# Patient Record
Sex: FEMALE | Race: WHITE | NOT HISPANIC OR LATINO | Employment: FULL TIME | URBAN - METROPOLITAN AREA
[De-identification: names, ages, dates, MRNs, and addresses within clinical notes are randomized per-mention and may not be internally consistent; named-entity substitution may affect disease eponyms.]

---

## 2023-03-29 LAB — THYROTROPIN (MIU/L) IN SER/PLAS BY DETECTION LIMIT <= 0.05 MIU/L: 2.39 MIU/L (ref 0.44–3.98)

## 2023-04-12 LAB
ANION GAP IN SER/PLAS: 10 MMOL/L (ref 10–20)
CALCIUM (MG/DL) IN SER/PLAS: 8.8 MG/DL (ref 8.6–10.3)
CARBON DIOXIDE, TOTAL (MMOL/L) IN SER/PLAS: 29 MMOL/L (ref 21–32)
CHLORIDE (MMOL/L) IN SER/PLAS: 104 MMOL/L (ref 98–107)
CREATININE (MG/DL) IN SER/PLAS: 0.72 MG/DL (ref 0.5–1.05)
ESTIMATED AVERAGE GLUCOSE FOR HBA1C: 105 MG/DL
GFR FEMALE: >90 ML/MIN/1.73M2
GLUCOSE (MG/DL) IN SER/PLAS: 77 MG/DL (ref 74–99)
HEMOGLOBIN A1C/HEMOGLOBIN TOTAL IN BLOOD: 5.3 %
POTASSIUM (MMOL/L) IN SER/PLAS: 3.8 MMOL/L (ref 3.5–5.3)
SODIUM (MMOL/L) IN SER/PLAS: 139 MMOL/L (ref 136–145)
UREA NITROGEN (MG/DL) IN SER/PLAS: 10 MG/DL (ref 6–23)

## 2023-04-13 LAB
ESTRADIOL (PG/ML) IN SER/PLAS: 52 PG/ML
FOLLITROPIN (IU/L) IN SER/PLAS: 8 IU/L
INSULIN: 13 UIU/ML (ref 3–25)
LUTEINIZING HORMONE (IU/ML) IN SER/PLAS: 10.2 IU/L

## 2023-04-18 LAB
TESTOSTERONE FREE (CHAN): 6.7 PG/ML (ref 0.1–6.4)
TESTOSTERONE,TOTAL,LC-MS/MS: 30 NG/DL (ref 2–45)

## 2023-09-25 LAB — THYROTROPIN (MIU/L) IN SER/PLAS BY DETECTION LIMIT <= 0.05 MIU/L: 2.34 MIU/L (ref 0.44–3.98)

## 2023-10-31 ENCOUNTER — TELEPHONE (OUTPATIENT)
Dept: OBSTETRICS AND GYNECOLOGY | Facility: CLINIC | Age: 23
End: 2023-10-31
Payer: COMMERCIAL

## 2023-10-31 ENCOUNTER — TELEPHONE (OUTPATIENT)
Dept: ENDOCRINOLOGY | Facility: CLINIC | Age: 23
End: 2023-10-31
Payer: COMMERCIAL

## 2023-10-31 DIAGNOSIS — E03.9 HYPOTHYROIDISM IN PREGNANCY, ANTEPARTUM, FIRST TRIMESTER (HHS-HCC): Primary | ICD-10-CM

## 2023-10-31 DIAGNOSIS — O99.281 HYPOTHYROIDISM IN PREGNANCY, ANTEPARTUM, FIRST TRIMESTER (HHS-HCC): Primary | ICD-10-CM

## 2023-10-31 DIAGNOSIS — O36.80X0 PREGNANCY WITH INCONCLUSIVE FETAL VIABILITY (HHS-HCC): ICD-10-CM

## 2023-10-31 DIAGNOSIS — E03.9 HYPOTHYROIDISM, UNSPECIFIED: ICD-10-CM

## 2023-10-31 NOTE — TELEPHONE ENCOUNTER
Ultrasound can be scheduled anytime last week of November or after (including nob appointment if she'd rather)

## 2023-11-01 ENCOUNTER — LAB (OUTPATIENT)
Dept: LAB | Facility: LAB | Age: 23
End: 2023-11-01
Payer: COMMERCIAL

## 2023-11-01 DIAGNOSIS — E03.9 HYPOTHYROIDISM IN PREGNANCY, ANTEPARTUM, FIRST TRIMESTER (HHS-HCC): ICD-10-CM

## 2023-11-01 DIAGNOSIS — O99.281 HYPOTHYROIDISM IN PREGNANCY, ANTEPARTUM, FIRST TRIMESTER (HHS-HCC): ICD-10-CM

## 2023-11-01 LAB — TSH SERPL-ACNC: 2.97 MIU/L (ref 0.44–3.98)

## 2023-11-01 PROCEDURE — 84443 ASSAY THYROID STIM HORMONE: CPT

## 2023-11-01 PROCEDURE — 36415 COLL VENOUS BLD VENIPUNCTURE: CPT

## 2023-11-01 RX ORDER — LEVOTHYROXINE SODIUM 100 UG/1
100 TABLET ORAL
Qty: 90 TABLET | Refills: 1 | Status: SHIPPED | OUTPATIENT
Start: 2023-11-01 | End: 2023-11-30 | Stop reason: DRUGHIGH

## 2023-11-30 ENCOUNTER — ANCILLARY PROCEDURE (OUTPATIENT)
Dept: RADIOLOGY | Facility: CLINIC | Age: 23
End: 2023-11-30
Payer: COMMERCIAL

## 2023-11-30 ENCOUNTER — INITIAL PRENATAL (OUTPATIENT)
Dept: OBSTETRICS AND GYNECOLOGY | Facility: CLINIC | Age: 23
End: 2023-11-30
Payer: COMMERCIAL

## 2023-11-30 ENCOUNTER — LAB (OUTPATIENT)
Dept: LAB | Facility: LAB | Age: 23
End: 2023-11-30
Payer: COMMERCIAL

## 2023-11-30 VITALS — WEIGHT: 195 LBS | DIASTOLIC BLOOD PRESSURE: 82 MMHG | BODY MASS INDEX: 31.98 KG/M2 | SYSTOLIC BLOOD PRESSURE: 120 MMHG

## 2023-11-30 DIAGNOSIS — Z3A.08 8 WEEKS GESTATION OF PREGNANCY (HHS-HCC): ICD-10-CM

## 2023-11-30 DIAGNOSIS — O99.211 OBESITY AFFECTING PREGNANCY IN FIRST TRIMESTER, UNSPECIFIED OBESITY TYPE (HHS-HCC): ICD-10-CM

## 2023-11-30 DIAGNOSIS — O99.281 HYPOTHYROIDISM AFFECTING PREGNANCY IN FIRST TRIMESTER (HHS-HCC): Primary | ICD-10-CM

## 2023-11-30 DIAGNOSIS — E03.9 HYPOTHYROIDISM AFFECTING PREGNANCY IN FIRST TRIMESTER (HHS-HCC): Primary | ICD-10-CM

## 2023-11-30 DIAGNOSIS — O34.219 UTERINE SCAR FROM PREVIOUS CESAREAN DELIVERY AFFECTING PREGNANCY (HHS-HCC): ICD-10-CM

## 2023-11-30 DIAGNOSIS — E03.9 HYPOTHYROIDISM, UNSPECIFIED: ICD-10-CM

## 2023-11-30 DIAGNOSIS — O36.80X0 PREGNANCY WITH INCONCLUSIVE FETAL VIABILITY (HHS-HCC): ICD-10-CM

## 2023-11-30 PROBLEM — Z67.91 RH NEGATIVE STATE IN ANTEPARTUM PERIOD (HHS-HCC): Status: ACTIVE | Noted: 2023-11-30

## 2023-11-30 PROBLEM — O99.280: Status: ACTIVE | Noted: 2023-11-30

## 2023-11-30 PROBLEM — O26.899 RH NEGATIVE STATE IN ANTEPARTUM PERIOD (HHS-HCC): Status: ACTIVE | Noted: 2023-11-30

## 2023-11-30 LAB
ABO GROUP (TYPE) IN BLOOD: NORMAL
ANTIBODY SCREEN: NORMAL
ERYTHROCYTE [DISTWIDTH] IN BLOOD BY AUTOMATED COUNT: 11.7 % (ref 11.5–14.5)
EST. AVERAGE GLUCOSE BLD GHB EST-MCNC: 103 MG/DL
HBA1C MFR BLD: 5.2 %
HBV SURFACE AG SERPL QL IA: NONREACTIVE
HCT VFR BLD AUTO: 38.1 % (ref 36–46)
HGB BLD-MCNC: 12.6 G/DL (ref 12–16)
HIV 1+2 AB+HIV1 P24 AG SERPL QL IA: NONREACTIVE
MCH RBC QN AUTO: 31.3 PG (ref 26–34)
MCHC RBC AUTO-ENTMCNC: 33.1 G/DL (ref 32–36)
MCV RBC AUTO: 95 FL (ref 80–100)
NRBC BLD-RTO: 0 /100 WBCS (ref 0–0)
PLATELET # BLD AUTO: 249 X10*3/UL (ref 150–450)
RBC # BLD AUTO: 4.03 X10*6/UL (ref 4–5.2)
RH FACTOR (ANTIGEN D): NORMAL
WBC # BLD AUTO: 11 X10*3/UL (ref 4.4–11.3)

## 2023-11-30 PROCEDURE — 87800 DETECT AGNT MULT DNA DIREC: CPT

## 2023-11-30 PROCEDURE — 86317 IMMUNOASSAY INFECTIOUS AGENT: CPT

## 2023-11-30 PROCEDURE — 36415 COLL VENOUS BLD VENIPUNCTURE: CPT

## 2023-11-30 PROCEDURE — 86901 BLOOD TYPING SEROLOGIC RH(D): CPT

## 2023-11-30 PROCEDURE — 87389 HIV-1 AG W/HIV-1&-2 AB AG IA: CPT

## 2023-11-30 PROCEDURE — 86780 TREPONEMA PALLIDUM: CPT

## 2023-11-30 PROCEDURE — 86850 RBC ANTIBODY SCREEN: CPT

## 2023-11-30 PROCEDURE — 90686 IIV4 VACC NO PRSV 0.5 ML IM: CPT | Performed by: OBSTETRICS & GYNECOLOGY

## 2023-11-30 PROCEDURE — 87086 URINE CULTURE/COLONY COUNT: CPT

## 2023-11-30 PROCEDURE — 0500F INITIAL PRENATAL CARE VISIT: CPT | Performed by: OBSTETRICS & GYNECOLOGY

## 2023-11-30 PROCEDURE — 90471 IMMUNIZATION ADMIN: CPT | Performed by: OBSTETRICS & GYNECOLOGY

## 2023-11-30 PROCEDURE — 76801 OB US < 14 WKS SINGLE FETUS: CPT

## 2023-11-30 PROCEDURE — 87340 HEPATITIS B SURFACE AG IA: CPT

## 2023-11-30 PROCEDURE — 83036 HEMOGLOBIN GLYCOSYLATED A1C: CPT

## 2023-11-30 PROCEDURE — 86900 BLOOD TYPING SEROLOGIC ABO: CPT

## 2023-11-30 PROCEDURE — 76801 OB US < 14 WKS SINGLE FETUS: CPT | Performed by: STUDENT IN AN ORGANIZED HEALTH CARE EDUCATION/TRAINING PROGRAM

## 2023-11-30 PROCEDURE — 85027 COMPLETE CBC AUTOMATED: CPT

## 2023-11-30 RX ORDER — LEVOTHYROXINE SODIUM 112 UG/1
112 TABLET ORAL
Qty: 30 TABLET | Refills: 11 | Status: SHIPPED | OUTPATIENT
Start: 2023-11-30 | End: 2024-11-29

## 2023-11-30 RX ORDER — METFORMIN HYDROCHLORIDE 500 MG/1
500 TABLET ORAL EVERY 12 HOURS
COMMUNITY

## 2023-11-30 NOTE — PROGRESS NOTES
Subjective   Patient ID [id number]   Fatuma Dang is a 23 y.o.  at 8w2d with a working estimated date of delivery of 2024, by Last Menstrual Period who presents for an initial prenatal visit.     Her pregnancy is complicated by:  Hypothyroidism, obesity with insulin resistance, prior  section.    OB History    Para Term  AB Living   2 1 1     1   SAB IAB Ectopic Multiple Live Births           1      # Outcome Date GA Lbr Harvinder/2nd Weight Sex Delivery Anes PTL Lv   2 Current            1 Term 22 40w0d  3629 g M CS-LTranv EPI N TED          Objective   Physical Exam  Weight: 88.5 kg (195 lb)  Expected Total Weight Gain: 5 kg (11 lb)-9 kg (19 lb)   Pregravid BMI: 30.34  BP: 120/82          Physical Exam  Constitutional:       Appearance: Normal appearance.   Genitourinary:      Bladder, rectum and urethral meatus normal.      Right Labia: No rash or lesions.     Left Labia: No lesions or rash.     No vaginal discharge.      No vaginal prolapse present.     No vaginal atrophy present.       Right Adnexa: not tender and no mass present.     Left Adnexa: not tender and no mass present.     No cervical discharge or lesion.      Uterus is enlarged.      Pelvic exam was performed with patient in the lithotomy position.   Breasts:     Breasts are soft.     Right: Normal.      Left: Normal.   HENT:      Head: Normocephalic and atraumatic.      Nose: Nose normal.   Cardiovascular:      Rate and Rhythm: Normal rate and regular rhythm.      Heart sounds: Normal heart sounds.   Pulmonary:      Effort: Pulmonary effort is normal.      Breath sounds: Normal breath sounds.   Abdominal:      Palpations: Abdomen is soft.   Musculoskeletal:      Cervical back: Neck supple.   Neurological:      General: No focal deficit present.      Mental Status: She is alert and oriented to person, place, and time.   Skin:     General: Skin is warm and dry.      Findings: No rash.   Psychiatric:         Mood and  Affect: Mood normal.       Prenatal Labs  Are ordered    Problem List Items Addressed This Visit       Hypothyroidism complicating pregnancy - Primary    Overview     Plan TSH each trimester with goal of 2.5 or less.         Obesity complicating pregnancy in first trimester    Overview     Starting BMI 31.   Was on metformin at conception. Plan to stop at 12 weeks and proceed with glucola. Hgb A1c is ordered.   Plan EFW at 30 and 36 weeks.          8 weeks gestation of pregnancy    Overview     Desires NIPS.         Uterine scar from previous  delivery affecting pregnancy    Overview     Progressed to 9 cm then arrest of dilation.                Immunizations: flu vaccine today  Prenatal Labs ordered including Hgb A1c.   Daily prenatal vitamins prescribed  First trimester screening and second trimester screening discussed. Patient decided to proceed with NIPS and declines 13 week ultrasound.   Will increase levothyroxine today and recheck TSH at next visit with NIPS.  Follow up in 4-6 weeks for return OB visit.

## 2023-12-01 LAB
C TRACH RRNA SPEC QL NAA+PROBE: NEGATIVE
N GONORRHOEA DNA SPEC QL PROBE+SIG AMP: NEGATIVE
REFLEX ADDED, ANEMIA PANEL: NORMAL
RUBV IGG SERPL IA-ACNC: 0.7 IA
RUBV IGG SERPL QL IA: NEGATIVE
T PALLIDUM AB SER QL: NONREACTIVE

## 2023-12-02 LAB — BACTERIA UR CULT: NORMAL

## 2023-12-07 ENCOUNTER — APPOINTMENT (OUTPATIENT)
Dept: OBSTETRICS AND GYNECOLOGY | Facility: CLINIC | Age: 23
End: 2023-12-07
Payer: COMMERCIAL

## 2023-12-14 ENCOUNTER — APPOINTMENT (OUTPATIENT)
Dept: OBSTETRICS AND GYNECOLOGY | Facility: CLINIC | Age: 23
End: 2023-12-14
Payer: COMMERCIAL

## 2023-12-27 NOTE — RESULT ENCOUNTER NOTE
Labs have been reviewed.  The thyroid level is normal.  Please continue the same dose of Levothyroxine and follow up as scheduled.

## 2024-01-08 PROBLEM — Z3A.14 14 WEEKS GESTATION OF PREGNANCY (HHS-HCC): Status: ACTIVE | Noted: 2023-11-30

## 2024-01-09 ENCOUNTER — ROUTINE PRENATAL (OUTPATIENT)
Dept: OBSTETRICS AND GYNECOLOGY | Facility: CLINIC | Age: 24
End: 2024-01-09
Payer: COMMERCIAL

## 2024-01-09 VITALS — DIASTOLIC BLOOD PRESSURE: 80 MMHG | BODY MASS INDEX: 29.85 KG/M2 | SYSTOLIC BLOOD PRESSURE: 120 MMHG | WEIGHT: 182 LBS

## 2024-01-09 DIAGNOSIS — Z3A.14 14 WEEKS GESTATION OF PREGNANCY (HHS-HCC): ICD-10-CM

## 2024-01-09 DIAGNOSIS — O34.219 UTERINE SCAR FROM PREVIOUS CESAREAN DELIVERY AFFECTING PREGNANCY (HHS-HCC): ICD-10-CM

## 2024-01-09 DIAGNOSIS — O99.211 OBESITY AFFECTING PREGNANCY IN FIRST TRIMESTER, UNSPECIFIED OBESITY TYPE (HHS-HCC): Primary | ICD-10-CM

## 2024-01-09 PROCEDURE — 0501F PRENATAL FLOW SHEET: CPT | Performed by: OBSTETRICS & GYNECOLOGY

## 2024-01-09 NOTE — PROGRESS NOTES
"Subjective   Patient ID [id number]   Fatuma Dang is a 23 y.o. who presents for a routine prenatal visit. She denies vaginal bleeding, leakage of fluid, decreased fetal movements, or contractions.  She has had more mood swings and notes she cries easily. She states she has good support at home and declines counseling or medication.     Objective   Physical Exam  Weight: 82.6 kg (182 lb)  Expected Total Weight Gain: 5 kg (11 lb)-9 kg (19 lb)   Pregravid BMI: 30.34  BP: 120/80  Fetal Heart Rate: 150      Prenatal Labs  Urine dip:  No results found for: \"KETONESU\", \"GLUCOSEUR\", \"LEUKOCYTESUR\"    Lab Results   Component Value Date    HGB 12.6 2023    HCT 38.1 2023    ABO B 2023    HEPBSAG Nonreactive 2023         Problem List Items Addressed This Visit       Obesity complicating pregnancy in first trimester - Primary    Overview     Starting BMI 31.   Was on metformin at conception. Plan to stop at 12 weeks and proceed with glucola. Hgb A1c 5.2%.  Plan EFW at 30 and 36 weeks.          14 weeks gestation of pregnancy    Overview     Desires NIPS.         Uterine scar from previous  delivery affecting pregnancy    Overview     Progressed to 9 cm then arrest of dilation. She desires 39 week repeat CS.              Continue prenatal vitamin.  Labs reviewed.  Follow up for usn and routine care.  Follow up as scheduled for a routine prenatal visit.  "

## 2024-01-10 ENCOUNTER — TELEPHONE (OUTPATIENT)
Dept: OBSTETRICS AND GYNECOLOGY | Facility: CLINIC | Age: 24
End: 2024-01-10
Payer: COMMERCIAL

## 2024-01-10 DIAGNOSIS — E03.9 HYPOTHYROIDISM AFFECTING PREGNANCY IN SECOND TRIMESTER (HHS-HCC): ICD-10-CM

## 2024-01-10 DIAGNOSIS — O99.282 HYPOTHYROIDISM AFFECTING PREGNANCY IN SECOND TRIMESTER (HHS-HCC): ICD-10-CM

## 2024-01-10 DIAGNOSIS — Z3A.14 14 WEEKS GESTATION OF PREGNANCY (HHS-HCC): ICD-10-CM

## 2024-01-10 NOTE — TELEPHONE ENCOUNTER
----- Message from Fatuma Dang sent at 1/10/2024  1:23 PM EST -----  Regarding: NIPT test and Thyroid test  Contact: [phone]  Hi!    I just had my 14 week OB appointment yesterday, and I realized I forgot to ask about if my bloodwork requests were in the system.    The nurse asked about wanting to do NIPT testing. I said I want to do it (+ the gender addition), but then I forgot to ask about whether or not I needed any paperwork to get that bloodwork done.    Additionally, at 8 weeks, we upped my synthroid medication from 100 to 112, and I was supposed to remind Dr. Perez at yesterdays appointment to recheck my thyroid. Which I also forgot to do (blaming pregnancy brain :) ).    Could you let me know if/when these labs are in the system and I'll go get my bloodwork drawn? I typically use the  lab in [place] unless I'm already at [place] for an appointment. Thanks!!

## 2024-01-10 NOTE — TELEPHONE ENCOUNTER
----- Message from Fatuma Dang sent at 1/10/2024  1:23 PM EST -----  Regarding: NIPT test and Thyroid test  Contact: [phone]  Hi!    I just had my 14 week OB appointment yesterday, and I realized I forgot to ask about if my bloodwork requests were in the system.    The nurse asked about wanting to do NIPT testing. I said I want to do it (+ the gender addition), but then I forgot to ask about whether or not I needed any paperwork to get that bloodwork done.    Additionally, at 8 weeks, we upped my synthroid medication from 100 to 112, and I was supposed to remind Dr. Perez at yesterdays appointment to recheck my thyroid. Which I also forgot to do (blaming pregnancy brain :) ).    Could you let me know if/when these labs are in the system and I'll go get my bloodwork drawn? I typically use the  lab in [place] unless I'm already at [place] for an appointment. Thanks!!

## 2024-01-17 ENCOUNTER — LAB (OUTPATIENT)
Dept: LAB | Facility: LAB | Age: 24
End: 2024-01-17
Payer: COMMERCIAL

## 2024-01-17 DIAGNOSIS — O99.282 HYPOTHYROIDISM AFFECTING PREGNANCY IN SECOND TRIMESTER (HHS-HCC): ICD-10-CM

## 2024-01-17 DIAGNOSIS — Z3A.14 14 WEEKS GESTATION OF PREGNANCY (HHS-HCC): ICD-10-CM

## 2024-01-17 DIAGNOSIS — E03.9 HYPOTHYROIDISM AFFECTING PREGNANCY IN SECOND TRIMESTER (HHS-HCC): ICD-10-CM

## 2024-01-17 LAB — TSH SERPL-ACNC: 0.91 MIU/L (ref 0.44–3.98)

## 2024-01-17 PROCEDURE — 84443 ASSAY THYROID STIM HORMONE: CPT

## 2024-01-17 PROCEDURE — 36415 COLL VENOUS BLD VENIPUNCTURE: CPT

## 2024-02-19 PROBLEM — Z3A.20 20 WEEKS GESTATION OF PREGNANCY (HHS-HCC): Status: ACTIVE | Noted: 2023-11-30

## 2024-02-22 ENCOUNTER — ROUTINE PRENATAL (OUTPATIENT)
Dept: OBSTETRICS AND GYNECOLOGY | Facility: CLINIC | Age: 24
End: 2024-02-22
Payer: COMMERCIAL

## 2024-02-22 ENCOUNTER — HOSPITAL ENCOUNTER (OUTPATIENT)
Dept: RADIOLOGY | Facility: CLINIC | Age: 24
Discharge: HOME | End: 2024-02-22
Payer: COMMERCIAL

## 2024-02-22 VITALS — WEIGHT: 180 LBS | DIASTOLIC BLOOD PRESSURE: 72 MMHG | SYSTOLIC BLOOD PRESSURE: 102 MMHG | BODY MASS INDEX: 29.52 KG/M2

## 2024-02-22 DIAGNOSIS — Z36.89 SCREENING, ANTENATAL, FOR FETAL ANATOMIC SURVEY (HHS-HCC): ICD-10-CM

## 2024-02-22 DIAGNOSIS — O99.280 HYPOTHYROIDISM AFFECTING PREGNANCY, ANTEPARTUM (HHS-HCC): ICD-10-CM

## 2024-02-22 DIAGNOSIS — O34.219 UTERINE SCAR FROM PREVIOUS CESAREAN DELIVERY AFFECTING PREGNANCY (HHS-HCC): ICD-10-CM

## 2024-02-22 DIAGNOSIS — Z67.91 RH NEGATIVE STATE IN ANTEPARTUM PERIOD (HHS-HCC): ICD-10-CM

## 2024-02-22 DIAGNOSIS — E03.9 HYPOTHYROIDISM AFFECTING PREGNANCY, ANTEPARTUM (HHS-HCC): ICD-10-CM

## 2024-02-22 DIAGNOSIS — O26.899 RH NEGATIVE STATE IN ANTEPARTUM PERIOD (HHS-HCC): ICD-10-CM

## 2024-02-22 DIAGNOSIS — Z3A.20 20 WEEKS GESTATION OF PREGNANCY (HHS-HCC): ICD-10-CM

## 2024-02-22 DIAGNOSIS — O99.212 OBESITY AFFECTING PREGNANCY IN SECOND TRIMESTER, UNSPECIFIED OBESITY TYPE (HHS-HCC): Primary | ICD-10-CM

## 2024-02-22 PROCEDURE — 0501F PRENATAL FLOW SHEET: CPT | Performed by: OBSTETRICS & GYNECOLOGY

## 2024-02-22 PROCEDURE — 76811 OB US DETAILED SNGL FETUS: CPT

## 2024-02-22 PROCEDURE — 76811 OB US DETAILED SNGL FETUS: CPT | Performed by: OBSTETRICS & GYNECOLOGY

## 2024-02-22 NOTE — PROGRESS NOTES
"Subjective   Patient ID [id number]   Fatuma Dang is a 23 y.o. who presents for a routine prenatal visit. She denies vaginal bleeding, leakage of fluid, decreased fetal movements, or contractions.  Anatomy usn was completed prior to visit with report pending.   Nausea is improved and she is eating well now.     Objective   Physical Exam  Weight: 81.6 kg (180 lb)  Expected Total Weight Gain: 5 kg (11 lb)-9 kg (19 lb)   Pregravid BMI: 30.34  BP: 102/72  Fetal Heart Rate: 140 Fundal Height (cm): 20 cm    Prenatal Labs  Urine dip:  No results found for: \"KETONESU\", \"GLUCOSEUR\", \"LEUKOCYTESUR\"    Lab Results   Component Value Date    HGB 12.6 2023    HCT 38.1 2023    ABO B 2023    HEPBSAG Nonreactive 2023         Problem List Items Addressed This Visit       Hypothyroidism complicating pregnancy    Overview     Plan TSH each trimester with goal of 2.5 or less.  TSH 2.97 on 2023.  TSH 0.91 on 2024 at 15 weeks.         Obesity affecting pregnancy in second trimester - Primary    Overview     Starting BMI 31.   Was on metformin at conception. Plan to stop at 12 weeks and proceed with glucola. Hgb A1c 5.2%.  Plan EFW at 30 and 36 weeks.          20 weeks gestation of pregnancy    Overview     Desires NIPS.         Uterine scar from previous  delivery affecting pregnancy    Overview     Progressed to 9 cm then arrest of dilation. She desires 39 week repeat CS.          Rh negative state in antepartum period        Continue prenatal vitamin.  Labs reviewed.    Follow up as scheduled for a routine prenatal visit.  "

## 2024-03-12 NOTE — PATIENT INSTRUCTIONS
Thank you for choosing [facility]  for your health care needs.  If you have any questions, concerns or medical needs, please feel free to contact our office at [phone].    Please ensure you complete your blood work one week before the next scheduled appointment.    To obtain blood test  To continue Levothyroxine 112mcg daily   Take levothyroxine on an empty stomach with water alone, 30-60 before eating or taking other medications, 4 hours before any calcium or iron supplement.  For follow up at 34 weeks

## 2024-03-12 NOTE — PROGRESS NOTES
"Subjective   Fatuma Dang is a 24 y.o. female who presents for follow up for Hashimoto's thyroiditis.      She is currently 23 weeks pregnant.  She lost 20 pounds in the first trimester.  She is now back to her prepregnancy weight.  Metformin was stopped at 12 weeks.  OG 7/9/24.      Currently Regimen  Levothyroxine 112mcg po daily      The patient had a thyroid ultrasound in November 2019 2019 which revealed changes consistent with Hashimoto's thyroiditis     Symptoms are as listed below:  Energy levels - good  Sleep - noctuira x 1 for the last 6-8 weeks   Temperature intolerances -denies   Bowel movements -once daily; regular  Hair changes -denies  Skin - dry to weather  Tremors - denies   Palpitations - denies   Dysphagia - denies   Dyspnea upon lying supine -denies   Dysphonia -denies   Weight changes -gained      Review of Systems   All other systems reviewed and are negative.      Objective   LMP 10/03/2023    Visit Vitals  /60 (BP Location: Left arm, Patient Position: Sitting, BP Cuff Size: Adult)   Pulse 83   Ht 1.676 m (5' 6\")   Wt 84.4 kg (186 lb)   LMP 10/03/2023   BMI 30.02 kg/m²   OB Status Pregnant   Smoking Status Never   BSA 1.98 m²      Physical Exam  Vitals and nursing note reviewed.   Constitutional:       General: She is not in acute distress.     Appearance: Normal appearance. She is normal weight.   HENT:      Head: Normocephalic and atraumatic.      Nose: Nose normal.      Mouth/Throat:      Mouth: Mucous membranes are moist.   Eyes:      Extraocular Movements: Extraocular movements intact.   Cardiovascular:      Rate and Rhythm: Normal rate and regular rhythm.   Pulmonary:      Effort: Pulmonary effort is normal.      Breath sounds: Normal breath sounds.   Abdominal:      Comments: Gravid    Musculoskeletal:         General: Normal range of motion.   Skin:     General: Skin is warm.   Neurological:      Mental Status: She is alert and oriented to person, place, and time. "   Psychiatric:         Mood and Affect: Mood normal.         Lab Results   Component Value Date    TSH 0.91 01/17/2024    FREET4 0.66 08/16/2022       Assessment/Plan   24 year old female presents for follow up for Hashimoto's thyroiditis.  She is currently 23 weeks pregnant.    Hashimoto's thyroiditis  To obtain blood test  To continue Levothyroxine 112mcg daily   Take levothyroxine on an empty stomach with water alone, 30-60 before eating or taking other medications, 4 hours before any calcium or iron supplement  Counseled that the goal TSH for the second trimester should be between  0.2-3.0 mIU/L  For follow up at 34 weeks

## 2024-03-13 ENCOUNTER — OFFICE VISIT (OUTPATIENT)
Dept: ENDOCRINOLOGY | Facility: CLINIC | Age: 24
End: 2024-03-13
Payer: COMMERCIAL

## 2024-03-13 VITALS
BODY MASS INDEX: 29.89 KG/M2 | DIASTOLIC BLOOD PRESSURE: 60 MMHG | HEIGHT: 66 IN | HEART RATE: 83 BPM | SYSTOLIC BLOOD PRESSURE: 120 MMHG | WEIGHT: 186 LBS

## 2024-03-13 DIAGNOSIS — E03.9 HYPOTHYROIDISM AFFECTING PREGNANCY IN SECOND TRIMESTER (HHS-HCC): Primary | ICD-10-CM

## 2024-03-13 DIAGNOSIS — O99.282 HYPOTHYROIDISM AFFECTING PREGNANCY IN SECOND TRIMESTER (HHS-HCC): Primary | ICD-10-CM

## 2024-03-13 PROCEDURE — 99213 OFFICE O/P EST LOW 20 MIN: CPT | Performed by: INTERNAL MEDICINE

## 2024-03-13 PROCEDURE — 1036F TOBACCO NON-USER: CPT | Performed by: INTERNAL MEDICINE

## 2024-03-14 PROBLEM — E06.3 HASHIMOTO'S THYROIDITIS: Status: ACTIVE | Noted: 2024-03-14

## 2024-03-14 NOTE — ASSESSMENT & PLAN NOTE
To obtain blood test  To continue Levothyroxine 112mcg daily   Take levothyroxine on an empty stomach with water alone, 30-60 before eating or taking other medications, 4 hours before any calcium or iron supplement  Counseled that the goal TSH for the second trimester should be between  0.2-3.0 mIU/L  For follow up at 34 weeks

## 2024-03-19 PROBLEM — Z3A.24 24 WEEKS GESTATION OF PREGNANCY (HHS-HCC): Status: ACTIVE | Noted: 2023-11-30

## 2024-03-21 ENCOUNTER — ROUTINE PRENATAL (OUTPATIENT)
Dept: OBSTETRICS AND GYNECOLOGY | Facility: CLINIC | Age: 24
End: 2024-03-21
Payer: COMMERCIAL

## 2024-03-21 ENCOUNTER — LAB (OUTPATIENT)
Dept: LAB | Facility: LAB | Age: 24
End: 2024-03-21
Payer: COMMERCIAL

## 2024-03-21 VITALS — DIASTOLIC BLOOD PRESSURE: 76 MMHG | WEIGHT: 185 LBS | BODY MASS INDEX: 29.86 KG/M2 | SYSTOLIC BLOOD PRESSURE: 124 MMHG

## 2024-03-21 DIAGNOSIS — E03.9 HYPOTHYROIDISM AFFECTING PREGNANCY IN SECOND TRIMESTER (HHS-HCC): ICD-10-CM

## 2024-03-21 DIAGNOSIS — O34.219 UTERINE SCAR FROM PREVIOUS CESAREAN DELIVERY AFFECTING PREGNANCY (HHS-HCC): Primary | ICD-10-CM

## 2024-03-21 DIAGNOSIS — O99.212 OBESITY AFFECTING PREGNANCY IN SECOND TRIMESTER, UNSPECIFIED OBESITY TYPE (HHS-HCC): ICD-10-CM

## 2024-03-21 DIAGNOSIS — O99.282 HYPOTHYROIDISM AFFECTING PREGNANCY IN SECOND TRIMESTER (HHS-HCC): ICD-10-CM

## 2024-03-21 DIAGNOSIS — Z3A.24 24 WEEKS GESTATION OF PREGNANCY (HHS-HCC): ICD-10-CM

## 2024-03-21 DIAGNOSIS — O26.899 RH NEGATIVE STATE IN ANTEPARTUM PERIOD (HHS-HCC): ICD-10-CM

## 2024-03-21 DIAGNOSIS — Z67.91 RH NEGATIVE STATE IN ANTEPARTUM PERIOD (HHS-HCC): ICD-10-CM

## 2024-03-21 LAB
ABO GROUP (TYPE) IN BLOOD: NORMAL
ANTIBODY SCREEN: NORMAL
ERYTHROCYTE [DISTWIDTH] IN BLOOD BY AUTOMATED COUNT: 12.8 % (ref 11.5–14.5)
GLUCOSE 1H P 50 G GLC PO SERPL-MCNC: 98 MG/DL
HCT VFR BLD AUTO: 37.7 % (ref 36–46)
HGB BLD-MCNC: 12 G/DL (ref 12–16)
MCH RBC QN AUTO: 31.1 PG (ref 26–34)
MCHC RBC AUTO-ENTMCNC: 31.8 G/DL (ref 32–36)
MCV RBC AUTO: 98 FL (ref 80–100)
NRBC BLD-RTO: 0 /100 WBCS (ref 0–0)
PLATELET # BLD AUTO: 175 X10*3/UL (ref 150–450)
RBC # BLD AUTO: 3.86 X10*6/UL (ref 4–5.2)
REFLEX ADDED, ANEMIA PANEL: NORMAL
RH FACTOR (ANTIGEN D): NORMAL
TREPONEMA PALLIDUM IGG+IGM AB [PRESENCE] IN SERUM OR PLASMA BY IMMUNOASSAY: NONREACTIVE
TSH SERPL-ACNC: 1.83 MIU/L (ref 0.44–3.98)
WBC # BLD AUTO: 12.8 X10*3/UL (ref 4.4–11.3)

## 2024-03-21 PROCEDURE — 86780 TREPONEMA PALLIDUM: CPT

## 2024-03-21 PROCEDURE — 0501F PRENATAL FLOW SHEET: CPT | Performed by: OBSTETRICS & GYNECOLOGY

## 2024-03-21 PROCEDURE — 84443 ASSAY THYROID STIM HORMONE: CPT

## 2024-03-21 PROCEDURE — 36415 COLL VENOUS BLD VENIPUNCTURE: CPT

## 2024-03-21 PROCEDURE — 82947 ASSAY GLUCOSE BLOOD QUANT: CPT

## 2024-03-21 PROCEDURE — 86850 RBC ANTIBODY SCREEN: CPT

## 2024-03-21 PROCEDURE — 86900 BLOOD TYPING SEROLOGIC ABO: CPT

## 2024-03-21 PROCEDURE — 85027 COMPLETE CBC AUTOMATED: CPT

## 2024-03-21 PROCEDURE — 86901 BLOOD TYPING SEROLOGIC RH(D): CPT

## 2024-03-21 NOTE — PROGRESS NOTES
"Subjective   Patient ID [id number]   Fatuma Dang is a 24 y.o. who presents for a routine prenatal visit. She denies vaginal bleeding, leakage of fluid, decreased fetal movements, or contractions.      Objective   Physical Exam  Weight: 83.9 kg (185 lb)  Expected Total Weight Gain: 7 kg (15 lb)-11.5 kg (25 lb)   Pregravid BMI: 29.87  BP: 124/76  Fetal Heart Rate: 135 Fundal Height (cm): 24 cm    Prenatal Labs  Urine dip:  No results found for: \"KETONESU\", \"GLUCOSEUR\", \"LEUKOCYTESUR\"    Lab Results   Component Value Date    HGB 12.6 2023    HCT 38.1 2023    ABO B 2023    HEPBSAG Nonreactive 2023         Problem List Items Addressed This Visit       Hypothyroidism complicating pregnancy    Overview     Plan TSH each trimester with goal of 2.5 or less.  TSH 2.97 on 2023.  TSH 0.91 on 2024 at 15 weeks.         Obesity affecting pregnancy in second trimester    Overview     Starting BMI 31.   Was on metformin at conception. Plan to stop at 12 weeks and proceed with glucola. Hgb A1c 5.2%.  Plan EFW at 30 and 36 weeks.          Relevant Orders    Glucose, 1 Hour Screen, Pregnancy    CBC Anemia Panel With Reflex,Pregnancy    Syphilis Screen with Reflex    24 weeks gestation of pregnancy    Overview     Desires NIPS.         Uterine scar from previous  delivery affecting pregnancy - Primary    Overview     Progressed to 9 cm then arrest of dilation. She desires 39 week repeat CS.          Rh negative state in antepartum period    Relevant Orders    Type And Screen        Continue prenatal vitamin.  Labs reviewed.  Glucola and TSH are to be done today.   Follow up as scheduled for a routine prenatal visit.  "

## 2024-03-31 NOTE — RESULT ENCOUNTER NOTE
Labs have been reviewed. The thyroid level looks good.  Please continue the same dose of Levothyroxine and follow up as scheduled.

## 2024-04-12 PROBLEM — Z3A.28 28 WEEKS GESTATION OF PREGNANCY (HHS-HCC): Status: ACTIVE | Noted: 2023-11-30

## 2024-04-18 ENCOUNTER — ROUTINE PRENATAL (OUTPATIENT)
Dept: OBSTETRICS AND GYNECOLOGY | Facility: CLINIC | Age: 24
End: 2024-04-18
Payer: COMMERCIAL

## 2024-04-18 VITALS — BODY MASS INDEX: 30.02 KG/M2 | DIASTOLIC BLOOD PRESSURE: 74 MMHG | SYSTOLIC BLOOD PRESSURE: 106 MMHG | WEIGHT: 186 LBS

## 2024-04-18 DIAGNOSIS — O99.280 HYPOTHYROIDISM AFFECTING PREGNANCY, ANTEPARTUM (HHS-HCC): ICD-10-CM

## 2024-04-18 DIAGNOSIS — O99.213 OBESITY AFFECTING PREGNANCY IN THIRD TRIMESTER, UNSPECIFIED OBESITY TYPE (HHS-HCC): Primary | ICD-10-CM

## 2024-04-18 DIAGNOSIS — E03.9 HYPOTHYROIDISM AFFECTING PREGNANCY, ANTEPARTUM (HHS-HCC): ICD-10-CM

## 2024-04-18 DIAGNOSIS — Z3A.28 28 WEEKS GESTATION OF PREGNANCY (HHS-HCC): ICD-10-CM

## 2024-04-18 DIAGNOSIS — Z23 NEED FOR DIPHTHERIA-TETANUS-PERTUSSIS (TDAP) VACCINE: ICD-10-CM

## 2024-04-18 DIAGNOSIS — Z67.91 RH NEGATIVE STATE IN ANTEPARTUM PERIOD (HHS-HCC): ICD-10-CM

## 2024-04-18 DIAGNOSIS — O34.219 UTERINE SCAR FROM PREVIOUS CESAREAN DELIVERY AFFECTING PREGNANCY (HHS-HCC): ICD-10-CM

## 2024-04-18 DIAGNOSIS — O26.899 RH NEGATIVE STATE IN ANTEPARTUM PERIOD (HHS-HCC): ICD-10-CM

## 2024-04-18 PROCEDURE — 96372 THER/PROPH/DIAG INJ SC/IM: CPT | Performed by: OBSTETRICS & GYNECOLOGY

## 2024-04-18 PROCEDURE — 90715 TDAP VACCINE 7 YRS/> IM: CPT | Performed by: OBSTETRICS & GYNECOLOGY

## 2024-04-18 PROCEDURE — 0501F PRENATAL FLOW SHEET: CPT | Performed by: OBSTETRICS & GYNECOLOGY

## 2024-04-18 PROCEDURE — 90471 IMMUNIZATION ADMIN: CPT | Performed by: OBSTETRICS & GYNECOLOGY

## 2024-04-18 NOTE — PROGRESS NOTES
"Subjective   Patient ID [id number]   Fatuma Dang is a 24 y.o.   at 28w2d with a working estimated date of delivery of 2024, by Last Menstrual Period who presents for a routine prenatal visit. She denies vaginal bleeding, leakage of fluid, decreased fetal movements, or contractions.  She has learned her job is ending this  and she will also loose her paid maternity leave.     Objective   Physical Exam  Weight: 84.4 kg (186 lb)  Expected Total Weight Gain: 7 kg (15 lb)-11.5 kg (25 lb)   Pregravid BMI: 29.87  BP: 106/74  Fetal Heart Rate: 140 Fundal Height (cm): 28 cm    Prenatal Labs  Urine dip:  No results found for: \"KETONESU\", \"GLUCOSEUR\", \"LEUKOCYTESUR\"    Lab Results   Component Value Date    HGB 12.0 2024    HCT 37.7 2024    ABO B 2024    HEPBSAG Nonreactive 2023         Problem List Items Addressed This Visit       28 weeks gestation of pregnancy ([facility])    Overview     Glucola 98.         Hypothyroidism complicating pregnancy ([facility])    Overview     Plan TSH each trimester with goal of 2.5 or less.  TSH 2.97 on 2023.  TSH 0.91 on 2024 at 15 weeks.  TSH 1.83 on 3/21         Obesity affecting pregnancy in third trimester ([facility]) - Primary    Overview     Starting BMI 31.   Was on metformin at conception. Plan to stop at 12 weeks and proceed with glucola. Hgb A1c 5.2%.  Plan EFW at 30 and 36 weeks.          Rh negative state in antepartum period ([facility])    Relevant Medications    Rho(D) immune globulin (RHOGAM) injection 300 mcg (Start on 2024  7:45 AM)    Uterine scar from previous  delivery affecting pregnancy ([facility])    Overview     Progressed to 9 cm then arrest of dilation. She desires 39 week repeat CS.           Other Visit Diagnoses       Need for diphtheria-tetanus-pertussis (Tdap) vaccine        Relevant Orders    Tdap vaccine, age 7 years and older  (BOOSTRIX) (Completed)             Continue prenatal vitamin.  Labs " reviewed.  Tdap and Rhogam today.  Follow up as scheduled for a routine prenatal visit.

## 2024-04-29 PROBLEM — Z3A.30 30 WEEKS GESTATION OF PREGNANCY (HHS-HCC): Status: ACTIVE | Noted: 2023-11-30

## 2024-05-02 ENCOUNTER — ROUTINE PRENATAL (OUTPATIENT)
Dept: OBSTETRICS AND GYNECOLOGY | Facility: CLINIC | Age: 24
End: 2024-05-02
Payer: COMMERCIAL

## 2024-05-02 VITALS — WEIGHT: 192 LBS | BODY MASS INDEX: 30.99 KG/M2 | DIASTOLIC BLOOD PRESSURE: 70 MMHG | SYSTOLIC BLOOD PRESSURE: 118 MMHG

## 2024-05-02 DIAGNOSIS — O34.219 UTERINE SCAR FROM PREVIOUS CESAREAN DELIVERY AFFECTING PREGNANCY (HHS-HCC): Primary | ICD-10-CM

## 2024-05-02 DIAGNOSIS — Z3A.30 30 WEEKS GESTATION OF PREGNANCY (HHS-HCC): ICD-10-CM

## 2024-05-02 DIAGNOSIS — O26.899 RH NEGATIVE STATE IN ANTEPARTUM PERIOD (HHS-HCC): ICD-10-CM

## 2024-05-02 DIAGNOSIS — O99.280 HYPOTHYROIDISM AFFECTING PREGNANCY, ANTEPARTUM (HHS-HCC): ICD-10-CM

## 2024-05-02 DIAGNOSIS — Z67.91 RH NEGATIVE STATE IN ANTEPARTUM PERIOD (HHS-HCC): ICD-10-CM

## 2024-05-02 DIAGNOSIS — E03.9 HYPOTHYROIDISM AFFECTING PREGNANCY, ANTEPARTUM (HHS-HCC): ICD-10-CM

## 2024-05-02 DIAGNOSIS — O99.213 OBESITY AFFECTING PREGNANCY IN THIRD TRIMESTER, UNSPECIFIED OBESITY TYPE (HHS-HCC): ICD-10-CM

## 2024-05-02 PROCEDURE — 0501F PRENATAL FLOW SHEET: CPT | Performed by: OBSTETRICS & GYNECOLOGY

## 2024-05-02 NOTE — PROGRESS NOTES
"Subjective   Patient ID [id number]   Fatuma Dang is a 24 y.o.   at 30w2d with a working estimated date of delivery of 2024, by Last Menstrual Period who presents for a routine prenatal visit. She denies vaginal bleeding, leakage of fluid, decreased fetal movements, or contractions.      Objective   Physical Exam  Weight: 87.1 kg (192 lb)  Expected Total Weight Gain: 7 kg (15 lb)-11.5 kg (25 lb)   Pregravid BMI: 29.87  BP: 118/70  Fetal Heart Rate: 145 Fundal Height (cm): 30 cm    Prenatal Labs  Urine dip:  No results found for: \"KETONESU\", \"GLUCOSEUR\", \"LEUKOCYTESUR\"    Lab Results   Component Value Date    HGB 12.0 2024    HCT 37.7 2024    ABO B 2024    HEPBSAG Nonreactive 2023         Problem List Items Addressed This Visit       30 weeks gestation of pregnancy ([facility])    Overview     Glucola 98.  Pediatrician Dr. Butch Dow         Hypothyroidism complicating pregnancy ([facility])    Overview     Plan TSH each trimester with goal of 2.5 or less.  TSH 2.97 on 2023.  TSH 0.91 on 2024 at 15 weeks.  TSH 1.83 on 3/21         Obesity affecting pregnancy in third trimester ([facility])    Overview     Starting BMI 31.   Was on metformin at conception. Plan to stop at 12 weeks and proceed with glucola. Hgb A1c 5.2%.  Plan EFW at 30 and 36 weeks.          Rh negative state in antepartum period ([facility])    Uterine scar from previous  delivery affecting pregnancy ([facility]) - Primary    Overview     Progressed to 9 cm then arrest of dilation. She desires 39 week repeat CS.              Continue prenatal vitamin.  Labs reviewed.    Follow up as scheduled for a routine prenatal visit.  "

## 2024-05-15 ENCOUNTER — APPOINTMENT (OUTPATIENT)
Dept: OBSTETRICS AND GYNECOLOGY | Facility: CLINIC | Age: 24
End: 2024-05-15
Payer: COMMERCIAL

## 2024-05-16 ENCOUNTER — APPOINTMENT (OUTPATIENT)
Dept: OBSTETRICS AND GYNECOLOGY | Facility: CLINIC | Age: 24
End: 2024-05-16
Payer: COMMERCIAL

## 2024-05-17 ENCOUNTER — TELEPHONE (OUTPATIENT)
Dept: OBSTETRICS AND GYNECOLOGY | Facility: CLINIC | Age: 24
End: 2024-05-17

## 2024-05-17 ENCOUNTER — APPOINTMENT (OUTPATIENT)
Dept: OBSTETRICS AND GYNECOLOGY | Facility: CLINIC | Age: 24
End: 2024-05-17
Payer: COMMERCIAL

## 2024-05-17 NOTE — TELEPHONE ENCOUNTER
Patient was called to cancel her appointment today with TB being out of the office.  She wanted to just schedule with Dr. Perez since she sees her, but wants to miss this week.  I told her we would let her know if that was ok.  Please advise her on what to do.    32 weeks pregnant

## 2024-05-21 ENCOUNTER — ROUTINE PRENATAL (OUTPATIENT)
Dept: OBSTETRICS AND GYNECOLOGY | Facility: CLINIC | Age: 24
End: 2024-05-21
Payer: COMMERCIAL

## 2024-05-21 VITALS — WEIGHT: 192.4 LBS | BODY MASS INDEX: 31.05 KG/M2 | SYSTOLIC BLOOD PRESSURE: 110 MMHG | DIASTOLIC BLOOD PRESSURE: 70 MMHG

## 2024-05-21 DIAGNOSIS — Z3A.33 33 WEEKS GESTATION OF PREGNANCY (HHS-HCC): Primary | ICD-10-CM

## 2024-05-21 LAB
POC GLUCOSE, URINE: NEGATIVE MG/DL
POC PROTEIN, URINE: NEGATIVE MG/DL

## 2024-05-21 PROCEDURE — 0501F PRENATAL FLOW SHEET: CPT | Performed by: OBSTETRICS & GYNECOLOGY

## 2024-05-29 PROBLEM — Z3A.34 34 WEEKS GESTATION OF PREGNANCY (HHS-HCC): Status: ACTIVE | Noted: 2023-11-30

## 2024-05-30 ENCOUNTER — PREP FOR PROCEDURE (OUTPATIENT)
Dept: OBSTETRICS AND GYNECOLOGY | Facility: CLINIC | Age: 24
End: 2024-05-30

## 2024-05-30 ENCOUNTER — ROUTINE PRENATAL (OUTPATIENT)
Dept: OBSTETRICS AND GYNECOLOGY | Facility: CLINIC | Age: 24
End: 2024-05-30
Payer: COMMERCIAL

## 2024-05-30 VITALS — SYSTOLIC BLOOD PRESSURE: 118 MMHG | DIASTOLIC BLOOD PRESSURE: 72 MMHG | BODY MASS INDEX: 30.99 KG/M2 | WEIGHT: 192 LBS

## 2024-05-30 DIAGNOSIS — Z3A.34 34 WEEKS GESTATION OF PREGNANCY (HHS-HCC): ICD-10-CM

## 2024-05-30 DIAGNOSIS — E03.9 HYPOTHYROIDISM AFFECTING PREGNANCY, ANTEPARTUM (HHS-HCC): ICD-10-CM

## 2024-05-30 DIAGNOSIS — O34.219 UTERINE SCAR FROM PREVIOUS CESAREAN DELIVERY AFFECTING PREGNANCY (HHS-HCC): Primary | ICD-10-CM

## 2024-05-30 DIAGNOSIS — O34.219 UTERINE SCAR FROM PREVIOUS CESAREAN DELIVERY AFFECTING PREGNANCY (HHS-HCC): ICD-10-CM

## 2024-05-30 DIAGNOSIS — Z67.91 RH NEGATIVE STATE IN ANTEPARTUM PERIOD (HHS-HCC): ICD-10-CM

## 2024-05-30 DIAGNOSIS — O99.280 HYPOTHYROIDISM AFFECTING PREGNANCY, ANTEPARTUM (HHS-HCC): ICD-10-CM

## 2024-05-30 DIAGNOSIS — O99.213 OBESITY AFFECTING PREGNANCY IN THIRD TRIMESTER, UNSPECIFIED OBESITY TYPE (HHS-HCC): Primary | ICD-10-CM

## 2024-05-30 DIAGNOSIS — O36.5990 POOR FETAL GROWTH AFFECTING MANAGEMENT OF MOTHER, ANTEPARTUM, SINGLE OR UNSPECIFIED FETUS (HHS-HCC): ICD-10-CM

## 2024-05-30 DIAGNOSIS — O26.899 RH NEGATIVE STATE IN ANTEPARTUM PERIOD (HHS-HCC): ICD-10-CM

## 2024-05-30 PROCEDURE — 0501F PRENATAL FLOW SHEET: CPT | Performed by: OBSTETRICS & GYNECOLOGY

## 2024-05-30 RX ORDER — SODIUM CITRATE AND CITRIC ACID MONOHYDRATE 334; 500 MG/5ML; MG/5ML
30 SOLUTION ORAL ONCE
OUTPATIENT
Start: 2024-05-30 | End: 2024-05-30

## 2024-05-30 RX ORDER — METOCLOPRAMIDE HYDROCHLORIDE 5 MG/ML
10 INJECTION INTRAMUSCULAR; INTRAVENOUS ONCE
OUTPATIENT
Start: 2024-05-30 | End: 2024-05-30

## 2024-05-30 RX ORDER — FAMOTIDINE 10 MG/ML
20 INJECTION INTRAVENOUS ONCE
OUTPATIENT
Start: 2024-05-30 | End: 2024-05-30

## 2024-05-30 RX ORDER — CEFAZOLIN SODIUM 2 G/100ML
2 INJECTION, SOLUTION INTRAVENOUS ONCE
OUTPATIENT
Start: 2024-05-30 | End: 2024-05-30

## 2024-05-30 NOTE — PROGRESS NOTES
"Subjective   Patient ID [id number]   Fatuma Dang is a 24 y.o.   at 34w2d with a working estimated date of delivery of 2024, by Last Menstrual Period who presents for a routine prenatal visit. She denies vaginal bleeding, leakage of fluid, decreased fetal movements, or contractions.      Objective   Physical Exam  Weight: 87.1 kg (192 lb)  Expected Total Weight Gain: 7 kg (15 lb)-11.5 kg (25 lb)   Pregravid BMI: 29.87  BP: 118/72  Fetal Heart Rate: 145 Fundal Height (cm): 31 cm    Prenatal Labs  Urine dip:  No results found for: \"KETONESU\", \"GLUCOSEUR\", \"LEUKOCYTESUR\"    Lab Results   Component Value Date    HGB 12.0 2024    HCT 37.7 2024    ABO B 2024    HEPBSAG Nonreactive 2023         Problem List Items Addressed This Visit       34 weeks gestation of pregnancy ([facility])    Overview     Glucola 98.  Pediatrician Dr. Butch Dow         Hypothyroidism complicating pregnancy ([facility])    Overview     Plan TSH each trimester with goal of 2.5 or less.  TSH 2.97 on 2023.  TSH 0.91 on 2024 at 15 weeks.  TSH 1.83 on 3/21         Obesity affecting pregnancy in third trimester ([facility]) - Primary    Overview     Starting BMI 31.   Was on metformin at conception. Plan to stop at 12 weeks and proceed with glucola. Hgb A1c 5.2%.  Plan EFW at 30 and 36 weeks.          Rh negative state in antepartum period ([facility])    SGA (small for gestational age), fetal, affecting care of mother, antepartum ([facility])    Overview     Will get EFW for SGA.         Uterine scar from previous  delivery affecting pregnancy ([facility])    Overview     Progressed to 9 cm then arrest of dilation. She desires 39 week repeat CS with BS.              Continue prenatal vitamin.  Labs reviewed.  Will get EFW for SGA.  She desires to schedule repeat CS with BS.  Follow up as scheduled for a routine prenatal visit.  "

## 2024-06-04 ENCOUNTER — OFFICE VISIT (OUTPATIENT)
Dept: ENDOCRINOLOGY | Facility: CLINIC | Age: 24
End: 2024-06-04
Payer: COMMERCIAL

## 2024-06-04 ENCOUNTER — HOSPITAL ENCOUNTER (OUTPATIENT)
Dept: RADIOLOGY | Facility: CLINIC | Age: 24
Discharge: HOME | End: 2024-06-04
Payer: COMMERCIAL

## 2024-06-04 ENCOUNTER — LAB (OUTPATIENT)
Dept: LAB | Facility: LAB | Age: 24
End: 2024-06-04
Payer: COMMERCIAL

## 2024-06-04 VITALS
HEIGHT: 65 IN | DIASTOLIC BLOOD PRESSURE: 60 MMHG | SYSTOLIC BLOOD PRESSURE: 102 MMHG | WEIGHT: 191 LBS | BODY MASS INDEX: 31.82 KG/M2 | HEART RATE: 93 BPM

## 2024-06-04 DIAGNOSIS — O99.283 HYPOTHYROIDISM AFFECTING PREGNANCY IN THIRD TRIMESTER (HHS-HCC): Primary | ICD-10-CM

## 2024-06-04 DIAGNOSIS — E03.9 HYPOTHYROIDISM AFFECTING PREGNANCY IN THIRD TRIMESTER (HHS-HCC): Primary | ICD-10-CM

## 2024-06-04 DIAGNOSIS — Z32.01 PREGNANCY TEST POSITIVE (HHS-HCC): ICD-10-CM

## 2024-06-04 DIAGNOSIS — E03.9 HYPOTHYROIDISM AFFECTING PREGNANCY IN THIRD TRIMESTER (HHS-HCC): ICD-10-CM

## 2024-06-04 DIAGNOSIS — O99.283 HYPOTHYROIDISM AFFECTING PREGNANCY IN THIRD TRIMESTER (HHS-HCC): ICD-10-CM

## 2024-06-04 LAB — TSH SERPL-ACNC: 1.8 MIU/L (ref 0.44–3.98)

## 2024-06-04 PROCEDURE — 99213 OFFICE O/P EST LOW 20 MIN: CPT | Performed by: INTERNAL MEDICINE

## 2024-06-04 PROCEDURE — 84443 ASSAY THYROID STIM HORMONE: CPT

## 2024-06-04 PROCEDURE — 76816 OB US FOLLOW-UP PER FETUS: CPT

## 2024-06-04 PROCEDURE — 36415 COLL VENOUS BLD VENIPUNCTURE: CPT

## 2024-06-04 NOTE — PATIENT INSTRUCTIONS
Thank you for choosing [facility]  for your health care needs.  If you have any questions, concerns or medical needs, please feel free to contact our office at [phone].    Please ensure you complete your blood work one week before the next scheduled appointment.    To obtain blood test today   To continue Levothyroxine 112mcg daily   To decrease to 100mcg po daily upon delivery   Take levothyroxine on an empty stomach with water alone, 30-60 before eating or taking other medications, 4 hours before any calcium or iron supplement.  For follow up 6 weeks after delivery

## 2024-06-04 NOTE — PROGRESS NOTES
"Subjective   Fatuma Dang is a 24 y.o. female who presents for follow up for Hashimoto's thyroiditis.      She is currently 35 weeks pregnant.  She is for a scheduled C section on 7/3.  Her prepregnancy weight was 185 lbs but she did lose down to 165 lbs initially.    She is planning to breast feeed     Currently Regimen  Levothyroxine 112mcg po daily      Symptoms are as listed below:  Energy levels - good  Sleep - noctuira x 1   Temperature intolerances -denies   Bowel movements -once daily; regular  Hair changes -denies  Tremors - denies   Palpitations - denies   Dysphagia - denies   Dyspnea upon lying supine -denies   Dysphonia -denies   Weight changes -gained      Review of Systems   All other systems reviewed and are negative.      Objective   LMP 10/03/2023    Visit Vitals  LMP 10/03/2023   OB Status Pregnant   Smoking Status Never      Visit Vitals  /60 (BP Location: Left arm, Patient Position: Sitting, BP Cuff Size: Adult)   Pulse 93   Ht 1.651 m (5' 5\")   Wt 86.6 kg (191 lb)   LMP 10/03/2023   BMI 31.78 kg/m²   OB Status Pregnant   Smoking Status Never   BSA 1.99 m²       Physical Exam  Vitals and nursing note reviewed.   Constitutional:       General: She is not in acute distress.     Appearance: Normal appearance. She is normal weight.   HENT:      Head: Normocephalic and atraumatic.      Nose: Nose normal.      Mouth/Throat:      Mouth: Mucous membranes are moist.   Eyes:      Extraocular Movements: Extraocular movements intact.   Cardiovascular:      Rate and Rhythm: Normal rate and regular rhythm.   Pulmonary:      Effort: Pulmonary effort is normal.      Breath sounds: Normal breath sounds.   Abdominal:      Comments: Gravid    Musculoskeletal:         General: Normal range of motion.   Skin:     General: Skin is warm.   Neurological:      Mental Status: She is alert and oriented to person, place, and time.   Psychiatric:         Mood and Affect: Mood normal.       Lab Results   Component " Value Date    TSH 1.83 03/21/2024    FREET4 0.66 08/16/2022       Assessment/Plan   24 year old female presents for follow up for Hashimoto's thyroiditis.  She is currently 35 weeks pregnant.    Hypothyroidism affecting pregnancy in third trimester ([facility])  To obtain blood test today   To continue Levothyroxine 112mcg daily   To decrease to 100mcg po daily upon delivery   Take levothyroxine on an empty stomach with water alone, 30-60 before eating or taking other medications, 4 hours before any calcium or iron supplement.  For follow up 6 weeks after delivery

## 2024-06-06 PROBLEM — O99.283 HYPOTHYROIDISM AFFECTING PREGNANCY IN THIRD TRIMESTER (HHS-HCC): Status: ACTIVE | Noted: 2023-11-30

## 2024-06-07 NOTE — RESULT ENCOUNTER NOTE
Labs have been reviewed.  The thyroid level is at goal.  Please continue the same dose of Levothyroxine and follow up as scheduled.

## 2024-06-07 NOTE — ASSESSMENT & PLAN NOTE
To obtain blood test today   To continue Levothyroxine 112mcg daily   To decrease to 100mcg po daily upon delivery   Take levothyroxine on an empty stomach with water alone, 30-60 before eating or taking other medications, 4 hours before any calcium or iron supplement.  For follow up 6 weeks after delivery

## 2024-06-11 PROBLEM — Z3A.36 36 WEEKS GESTATION OF PREGNANCY (HHS-HCC): Status: ACTIVE | Noted: 2023-11-30

## 2024-06-13 ENCOUNTER — APPOINTMENT (OUTPATIENT)
Dept: OBSTETRICS AND GYNECOLOGY | Facility: CLINIC | Age: 24
End: 2024-06-13
Payer: COMMERCIAL

## 2024-06-13 VITALS — WEIGHT: 192 LBS | DIASTOLIC BLOOD PRESSURE: 68 MMHG | BODY MASS INDEX: 31.95 KG/M2 | SYSTOLIC BLOOD PRESSURE: 108 MMHG

## 2024-06-13 DIAGNOSIS — O99.283 HYPOTHYROIDISM AFFECTING PREGNANCY IN THIRD TRIMESTER (HHS-HCC): ICD-10-CM

## 2024-06-13 DIAGNOSIS — Z3A.36 36 WEEKS GESTATION OF PREGNANCY (HHS-HCC): ICD-10-CM

## 2024-06-13 DIAGNOSIS — E03.9 HYPOTHYROIDISM AFFECTING PREGNANCY IN THIRD TRIMESTER (HHS-HCC): ICD-10-CM

## 2024-06-13 DIAGNOSIS — Z67.91 RH NEGATIVE STATE IN ANTEPARTUM PERIOD (HHS-HCC): ICD-10-CM

## 2024-06-13 DIAGNOSIS — O26.899 RH NEGATIVE STATE IN ANTEPARTUM PERIOD (HHS-HCC): ICD-10-CM

## 2024-06-13 DIAGNOSIS — O34.219 UTERINE SCAR FROM PREVIOUS CESAREAN DELIVERY AFFECTING PREGNANCY (HHS-HCC): ICD-10-CM

## 2024-06-13 DIAGNOSIS — O99.213 OBESITY AFFECTING PREGNANCY IN THIRD TRIMESTER, UNSPECIFIED OBESITY TYPE (HHS-HCC): Primary | ICD-10-CM

## 2024-06-13 DIAGNOSIS — O36.5990 POOR FETAL GROWTH AFFECTING MANAGEMENT OF MOTHER, ANTEPARTUM, SINGLE OR UNSPECIFIED FETUS (HHS-HCC): ICD-10-CM

## 2024-06-13 PROCEDURE — 0501F PRENATAL FLOW SHEET: CPT | Performed by: OBSTETRICS & GYNECOLOGY

## 2024-06-13 PROCEDURE — 87081 CULTURE SCREEN ONLY: CPT

## 2024-06-13 NOTE — PROGRESS NOTES
"Subjective   Patient ID [id number]   Fatuma Dang is a 24 y.o.   at 36w2d with a working estimated date of delivery of 2024, by Last Menstrual Period who presents for a routine prenatal visit. She denies vaginal bleeding, leakage of fluid, decreased fetal movements, or contractions.      Objective   Physical Exam  Weight: 87.1 kg (192 lb)  Expected Total Weight Gain: 5 kg (11 lb)-9 kg (19 lb)   Pregravid BMI: 30.79  BP: 108/68  Fetal Heart Rate: 135 Fundal Height (cm): 32 cm    Prenatal Labs  Urine dip:  No results found for: \"KETONESU\", \"GLUCOSEUR\", \"LEUKOCYTESUR\"    Lab Results   Component Value Date    HGB 12.0 2024    HCT 37.7 2024    ABO B 2024    HEPBSAG Nonreactive 2023         Problem List Items Addressed This Visit       Uterine scar from previous  delivery affecting pregnancy ([facility])    Overview     Progressed to 9 cm then arrest of dilation. She desires 39 week repeat CS with BS.   Repeat CS with BS is 7/3/2024.         SGA (small for gestational age), fetal, affecting care of mother, antepartum ([facility])    Overview     Will get EFW for SGA.  35 week EFW is AGA 2562g, 46%.          Rh negative state in antepartum period ([facility])    Obesity affecting pregnancy in third trimester ([facility]) - Primary    Overview     Starting BMI 31.   Was on metformin at conception. Plan to stop at 12 weeks and proceed with glucola. Hgb A1c 5.2%.  Plan EFW at 30 and 36 weeks.   35 week EFW 2562g, 46%.         Relevant Orders    Group B Streptococcus (GBS) Prenatal Screen, Culture    Hypothyroidism affecting pregnancy in third trimester ([facility])    Overview     Plan TSH each trimester with goal of 2.5 or less.  TSH 2.97 on 2023.  TSH 0.91 on 2024 at 15 weeks.  TSH 1.83 on 3/21  TSH 1.80 on          36 weeks gestation of pregnancy ([facility])    Overview     Glucola 98.  Pediatrician Dr. Butch Dow         Relevant Orders    Group B Streptococcus (GBS) Prenatal " Screen, Culture        Continue prenatal vitamin.  Labs reviewed.  GBBS is sent.  Follow up as scheduled for a routine prenatal visit.

## 2024-06-14 PROBLEM — Z3A.37 37 WEEKS GESTATION OF PREGNANCY (HHS-HCC): Status: ACTIVE | Noted: 2023-11-30

## 2024-06-16 LAB — GP B STREP GENITAL QL CULT: NORMAL

## 2024-06-20 ENCOUNTER — APPOINTMENT (OUTPATIENT)
Dept: OBSTETRICS AND GYNECOLOGY | Facility: CLINIC | Age: 24
End: 2024-06-20
Payer: COMMERCIAL

## 2024-06-20 VITALS — SYSTOLIC BLOOD PRESSURE: 112 MMHG | BODY MASS INDEX: 32.12 KG/M2 | WEIGHT: 193 LBS | DIASTOLIC BLOOD PRESSURE: 72 MMHG

## 2024-06-20 DIAGNOSIS — Z3A.37 37 WEEKS GESTATION OF PREGNANCY (HHS-HCC): ICD-10-CM

## 2024-06-20 DIAGNOSIS — Z67.91 RH NEGATIVE STATE IN ANTEPARTUM PERIOD (HHS-HCC): ICD-10-CM

## 2024-06-20 DIAGNOSIS — O99.283 HYPOTHYROIDISM AFFECTING PREGNANCY IN THIRD TRIMESTER (HHS-HCC): Primary | ICD-10-CM

## 2024-06-20 DIAGNOSIS — O26.899 RH NEGATIVE STATE IN ANTEPARTUM PERIOD (HHS-HCC): ICD-10-CM

## 2024-06-20 DIAGNOSIS — O34.219 UTERINE SCAR FROM PREVIOUS CESAREAN DELIVERY AFFECTING PREGNANCY (HHS-HCC): ICD-10-CM

## 2024-06-20 DIAGNOSIS — O99.213 OBESITY AFFECTING PREGNANCY IN THIRD TRIMESTER, UNSPECIFIED OBESITY TYPE (HHS-HCC): ICD-10-CM

## 2024-06-20 DIAGNOSIS — E03.9 HYPOTHYROIDISM AFFECTING PREGNANCY IN THIRD TRIMESTER (HHS-HCC): Primary | ICD-10-CM

## 2024-06-20 DIAGNOSIS — O36.5990 POOR FETAL GROWTH AFFECTING MANAGEMENT OF MOTHER, ANTEPARTUM, SINGLE OR UNSPECIFIED FETUS (HHS-HCC): ICD-10-CM

## 2024-06-20 PROCEDURE — 0501F PRENATAL FLOW SHEET: CPT | Performed by: OBSTETRICS & GYNECOLOGY

## 2024-06-20 NOTE — PROGRESS NOTES
"Subjective   Patient ID [id number]   Fatuma Dang is a 24 y.o.   at 37w2d with a working estimated date of delivery of 2024, by Last Menstrual Period who presents for a routine prenatal visit. She denies vaginal bleeding, leakage of fluid, decreased fetal movements, or contractions.      Objective   Physical Exam  Weight: 87.5 kg (193 lb)  Expected Total Weight Gain: 5 kg (11 lb)-9 kg (19 lb)   Pregravid BMI: 30.79  BP: 112/72         Prenatal Labs  Urine dip:  No results found for: \"KETONESU\", \"GLUCOSEUR\", \"LEUKOCYTESUR\"    Lab Results   Component Value Date    HGB 12.0 2024    HCT 37.7 2024    ABO B 2024    HEPBSAG Nonreactive 2023         Problem List Items Addressed This Visit       Uterine scar from previous  delivery affecting pregnancy ([facility])    Overview     Progressed to 9 cm then arrest of dilation. She desires 39 week repeat CS with BS.   Repeat CS with BS is 7/3/2024.         SGA (small for gestational age), fetal, affecting care of mother, antepartum ([facility])    Overview     Will get EFW for SGA.  35 week EFW is AGA 2562g, 46%.          Rh negative state in antepartum period ([facility])    Obesity affecting pregnancy in third trimester ([facility])    Overview     Starting BMI 31.   Was on metformin at conception. Plan to stop at 12 weeks and proceed with glucola. Hgb A1c 5.2%.  Plan EFW at 30 and 36 weeks.   35 week EFW 2562g, 46%.         Hypothyroidism affecting pregnancy in third trimester ([facility]) - Primary    Overview     Plan TSH each trimester with goal of 2.5 or less.  TSH 2.97 on 2023.  TSH 0.91 on 2024 at 15 weeks.  TSH 1.83 on 3/21  TSH 1.80 on          37 weeks gestation of pregnancy ([facility])    Overview     Glucola 98.  Pediatrician Dr. Butch Dow             Continue prenatal vitamin.  Labs reviewed.    Follow up as scheduled for a routine prenatal visit.  "

## 2024-06-26 PROBLEM — Z3A.38 38 WEEKS GESTATION OF PREGNANCY (HHS-HCC): Status: ACTIVE | Noted: 2023-11-30

## 2024-06-27 ENCOUNTER — APPOINTMENT (OUTPATIENT)
Dept: OBSTETRICS AND GYNECOLOGY | Facility: CLINIC | Age: 24
End: 2024-06-27
Payer: COMMERCIAL

## 2024-06-27 VITALS — DIASTOLIC BLOOD PRESSURE: 64 MMHG | WEIGHT: 198 LBS | SYSTOLIC BLOOD PRESSURE: 110 MMHG | BODY MASS INDEX: 32.95 KG/M2

## 2024-06-27 DIAGNOSIS — O36.5990 POOR FETAL GROWTH AFFECTING MANAGEMENT OF MOTHER, ANTEPARTUM, SINGLE OR UNSPECIFIED FETUS (HHS-HCC): ICD-10-CM

## 2024-06-27 DIAGNOSIS — O34.219 UTERINE SCAR FROM PREVIOUS CESAREAN DELIVERY AFFECTING PREGNANCY (HHS-HCC): ICD-10-CM

## 2024-06-27 DIAGNOSIS — O99.213 OBESITY AFFECTING PREGNANCY IN THIRD TRIMESTER, UNSPECIFIED OBESITY TYPE (HHS-HCC): Primary | ICD-10-CM

## 2024-06-27 DIAGNOSIS — Z67.91 RH NEGATIVE STATE IN ANTEPARTUM PERIOD (HHS-HCC): ICD-10-CM

## 2024-06-27 DIAGNOSIS — Z3A.38 38 WEEKS GESTATION OF PREGNANCY (HHS-HCC): ICD-10-CM

## 2024-06-27 DIAGNOSIS — O99.283 HYPOTHYROIDISM AFFECTING PREGNANCY IN THIRD TRIMESTER (HHS-HCC): ICD-10-CM

## 2024-06-27 DIAGNOSIS — O26.899 RH NEGATIVE STATE IN ANTEPARTUM PERIOD (HHS-HCC): ICD-10-CM

## 2024-06-27 DIAGNOSIS — E03.9 HYPOTHYROIDISM AFFECTING PREGNANCY IN THIRD TRIMESTER (HHS-HCC): ICD-10-CM

## 2024-06-27 PROCEDURE — 0501F PRENATAL FLOW SHEET: CPT | Performed by: OBSTETRICS & GYNECOLOGY

## 2024-06-27 NOTE — PROGRESS NOTES
"Subjective   Patient ID [id number]   Fatuma Dang is a 24 y.o.   at 38w2d with a working estimated date of delivery of 2024, by Last Menstrual Period who presents for a routine prenatal visit. She denies vaginal bleeding, leakage of fluid, decreased fetal movements, or contractions. She is feeling some cramping and increased discharge. She states if she goes into labor prior to CS she would consider TOLAC.       Objective   Physical Exam  Weight: 89.8 kg (198 lb)  Expected Total Weight Gain: Could not be calculated   Pregravid BMI: 30.79  BP: 110/64  Fetal Heart Rate: 140 Fundal Height (cm): 35 cm  /-2    Prenatal Labs  Urine dip:  No results found for: \"KETONESU\", \"GLUCOSEUR\", \"LEUKOCYTESUR\"    Lab Results   Component Value Date    HGB 12.0 2024    HCT 37.7 2024    ABO B 2024    HEPBSAG Nonreactive 2023         Problem List Items Addressed This Visit       Uterine scar from previous  delivery affecting pregnancy ([facility])    Overview     Progressed to 9 cm then arrest of dilation. She desires 39 week repeat CS with BS.   Repeat CS with BS is 7/3/2024.         Relevant Orders    CBC    Type And Screen    SGA (small for gestational age), fetal, affecting care of mother, antepartum ([facility])    Overview     Will get EFW for SGA.  35 week EFW is AGA 2562g, 46%.          Rh negative state in antepartum period ([facility])    Obesity affecting pregnancy in third trimester ([facility]) - Primary    Overview     Starting BMI 31.   Was on metformin at conception. Plan to stop at 12 weeks and proceed with glucola. Hgb A1c 5.2%.  Plan EFW at 30 and 36 weeks.   35 week EFW 2562g, 46%.         Hypothyroidism affecting pregnancy in third trimester ([facility])    Overview     Plan TSH each trimester with goal of 2.5 or less.  TSH 2.97 on 2023.  TSH 0.91 on 2024 at 15 weeks.  TSH 1.83 on 3/21  TSH 1.80 on          38 weeks gestation of pregnancy ([facility])    Overview     Glucola " 98.  Pediatrician Dr. Butch Dow             Continue prenatal vitamin.  Labs reviewed.  CS is scheduled for next week.   Follow up as scheduled for a routine prenatal visit.

## 2024-07-01 ENCOUNTER — LAB (OUTPATIENT)
Dept: LAB | Facility: LAB | Age: 24
End: 2024-07-01
Payer: COMMERCIAL

## 2024-07-01 DIAGNOSIS — O34.219 UTERINE SCAR FROM PREVIOUS CESAREAN DELIVERY AFFECTING PREGNANCY (HHS-HCC): ICD-10-CM

## 2024-07-01 PROCEDURE — 86901 BLOOD TYPING SEROLOGIC RH(D): CPT

## 2024-07-01 PROCEDURE — 86900 BLOOD TYPING SEROLOGIC ABO: CPT

## 2024-07-01 PROCEDURE — 86850 RBC ANTIBODY SCREEN: CPT

## 2024-07-01 PROCEDURE — 36415 COLL VENOUS BLD VENIPUNCTURE: CPT

## 2024-07-01 PROCEDURE — 86922 COMPATIBILITY TEST ANTIGLOB: CPT

## 2024-07-02 ENCOUNTER — PREP FOR PROCEDURE (OUTPATIENT)
Dept: OBSTETRICS AND GYNECOLOGY | Facility: HOSPITAL | Age: 24
End: 2024-07-02
Payer: COMMERCIAL

## 2024-07-02 ENCOUNTER — LAB REQUISITION (OUTPATIENT)
Dept: LAB | Facility: HOSPITAL | Age: 24
End: 2024-07-02
Payer: COMMERCIAL

## 2024-07-02 DIAGNOSIS — O34.219 MATERNAL CARE FOR UNSPECIFIED TYPE SCAR FROM PREVIOUS CESAREAN DELIVERY (HHS-HCC): ICD-10-CM

## 2024-07-02 LAB
ABO GROUP (TYPE) IN BLOOD: NORMAL
ANTIBODY SCREEN: NORMAL
BB ANTIBODY IDENTIFICATION: NORMAL
CASE #: NORMAL
RH FACTOR (ANTIGEN D): NORMAL

## 2024-07-03 ENCOUNTER — APPOINTMENT (OUTPATIENT)
Dept: OBSTETRICS AND GYNECOLOGY | Facility: CLINIC | Age: 24
End: 2024-07-03
Payer: COMMERCIAL

## 2024-07-03 ENCOUNTER — ANESTHESIA EVENT (OUTPATIENT)
Dept: OBSTETRICS AND GYNECOLOGY | Facility: HOSPITAL | Age: 24
End: 2024-07-03
Payer: COMMERCIAL

## 2024-07-03 ENCOUNTER — ANESTHESIA (OUTPATIENT)
Dept: OBSTETRICS AND GYNECOLOGY | Facility: HOSPITAL | Age: 24
End: 2024-07-03
Payer: COMMERCIAL

## 2024-07-03 ENCOUNTER — HOSPITAL ENCOUNTER (INPATIENT)
Facility: HOSPITAL | Age: 24
LOS: 2 days | Discharge: HOME | End: 2024-07-05
Attending: OBSTETRICS & GYNECOLOGY | Admitting: OBSTETRICS & GYNECOLOGY
Payer: COMMERCIAL

## 2024-07-03 DIAGNOSIS — Z98.891 STATUS POST CESAREAN SECTION: ICD-10-CM

## 2024-07-03 DIAGNOSIS — O34.219 UTERINE SCAR FROM PREVIOUS CESAREAN DELIVERY AFFECTING PREGNANCY (HHS-HCC): ICD-10-CM

## 2024-07-03 DIAGNOSIS — Z3A.39 39 WEEKS GESTATION OF PREGNANCY (HHS-HCC): Primary | ICD-10-CM

## 2024-07-03 LAB
ABO GROUP (TYPE) IN BLOOD: NORMAL
ERYTHROCYTE [DISTWIDTH] IN BLOOD BY AUTOMATED COUNT: 13.7 % (ref 11.5–14.5)
HCT VFR BLD AUTO: 35.9 % (ref 36–46)
HGB BLD-MCNC: 11.9 G/DL (ref 12–16)
MCH RBC QN AUTO: 31 PG (ref 26–34)
MCHC RBC AUTO-ENTMCNC: 33.1 G/DL (ref 32–36)
MCV RBC AUTO: 94 FL (ref 80–100)
NRBC BLD-RTO: 0 /100 WBCS (ref 0–0)
PLATELET # BLD AUTO: 157 X10*3/UL (ref 150–450)
RBC # BLD AUTO: 3.84 X10*6/UL (ref 4–5.2)
RH FACTOR (ANTIGEN D): NORMAL
TREPONEMA PALLIDUM IGG+IGM AB [PRESENCE] IN SERUM OR PLASMA BY IMMUNOASSAY: NONREACTIVE
WBC # BLD AUTO: 14.1 X10*3/UL (ref 4.4–11.3)

## 2024-07-03 PROCEDURE — 1100000001 HC PRIVATE ROOM DAILY

## 2024-07-03 PROCEDURE — 2500000004 HC RX 250 GENERAL PHARMACY W/ HCPCS (ALT 636 FOR OP/ED): Performed by: OBSTETRICS & GYNECOLOGY

## 2024-07-03 PROCEDURE — 51702 INSERT TEMP BLADDER CATH: CPT

## 2024-07-03 PROCEDURE — 59510 CESAREAN DELIVERY: CPT | Performed by: OBSTETRICS & GYNECOLOGY

## 2024-07-03 PROCEDURE — 36415 COLL VENOUS BLD VENIPUNCTURE: CPT | Performed by: OBSTETRICS & GYNECOLOGY

## 2024-07-03 PROCEDURE — 2500000001 HC RX 250 WO HCPCS SELF ADMINISTERED DRUGS (ALT 637 FOR MEDICARE OP): Performed by: OBSTETRICS & GYNECOLOGY

## 2024-07-03 PROCEDURE — 2500000004 HC RX 250 GENERAL PHARMACY W/ HCPCS (ALT 636 FOR OP/ED): Performed by: NURSE ANESTHETIST, CERTIFIED REGISTERED

## 2024-07-03 PROCEDURE — 2500000001 HC RX 250 WO HCPCS SELF ADMINISTERED DRUGS (ALT 637 FOR MEDICARE OP): Performed by: NURSE ANESTHETIST, CERTIFIED REGISTERED

## 2024-07-03 PROCEDURE — 7100000016 HC LABOR RECOVERY PER HOUR: Performed by: OBSTETRICS & GYNECOLOGY

## 2024-07-03 PROCEDURE — 85027 COMPLETE CBC AUTOMATED: CPT | Performed by: OBSTETRICS & GYNECOLOGY

## 2024-07-03 PROCEDURE — 58611 LIGATE OVIDUCT(S) ADD-ON: CPT | Performed by: OBSTETRICS & GYNECOLOGY

## 2024-07-03 PROCEDURE — 2500000004 HC RX 250 GENERAL PHARMACY W/ HCPCS (ALT 636 FOR OP/ED): Mod: JZ | Performed by: OBSTETRICS & GYNECOLOGY

## 2024-07-03 PROCEDURE — 7100000016 HC LABOR RECOVERY PER HOUR

## 2024-07-03 PROCEDURE — A6213 FOAM DRG >16<=48 SQ IN W/BDR: HCPCS | Performed by: OBSTETRICS & GYNECOLOGY

## 2024-07-03 PROCEDURE — 86780 TREPONEMA PALLIDUM: CPT | Mod: AHULAB | Performed by: OBSTETRICS & GYNECOLOGY

## 2024-07-03 PROCEDURE — 3700000014 HC AN EPIDURAL BLOCK CHARGE: Performed by: OBSTETRICS & GYNECOLOGY

## 2024-07-03 PROCEDURE — 7200000001 HC TUBAL LIGATION: Performed by: OBSTETRICS & GYNECOLOGY

## 2024-07-03 PROCEDURE — 59514 CESAREAN DELIVERY ONLY: CPT | Performed by: OBSTETRICS & GYNECOLOGY

## 2024-07-03 PROCEDURE — 2720000007 HC OR 272 NO HCPCS: Performed by: OBSTETRICS & GYNECOLOGY

## 2024-07-03 RX ORDER — METOCLOPRAMIDE HYDROCHLORIDE 5 MG/ML
10 INJECTION INTRAMUSCULAR; INTRAVENOUS EVERY 6 HOURS PRN
Status: DISCONTINUED | OUTPATIENT
Start: 2024-07-03 | End: 2024-07-05 | Stop reason: HOSPADM

## 2024-07-03 RX ORDER — CARBOPROST TROMETHAMINE 250 UG/ML
250 INJECTION, SOLUTION INTRAMUSCULAR ONCE AS NEEDED
Status: DISCONTINUED | OUTPATIENT
Start: 2024-07-03 | End: 2024-07-05 | Stop reason: HOSPADM

## 2024-07-03 RX ORDER — OXYCODONE HYDROCHLORIDE 5 MG/1
5 TABLET ORAL EVERY 4 HOURS PRN
Status: DISCONTINUED | OUTPATIENT
Start: 2024-07-04 | End: 2024-07-05 | Stop reason: HOSPADM

## 2024-07-03 RX ORDER — HYDRALAZINE HYDROCHLORIDE 20 MG/ML
5 INJECTION INTRAMUSCULAR; INTRAVENOUS ONCE AS NEEDED
Status: DISCONTINUED | OUTPATIENT
Start: 2024-07-03 | End: 2024-07-05 | Stop reason: HOSPADM

## 2024-07-03 RX ORDER — KETOROLAC TROMETHAMINE 30 MG/ML
30 INJECTION, SOLUTION INTRAMUSCULAR; INTRAVENOUS EVERY 6 HOURS
Status: COMPLETED | OUTPATIENT
Start: 2024-07-03 | End: 2024-07-04

## 2024-07-03 RX ORDER — POLYETHYLENE GLYCOL 3350 17 G/17G
17 POWDER, FOR SOLUTION ORAL 2 TIMES DAILY PRN
Status: DISCONTINUED | OUTPATIENT
Start: 2024-07-03 | End: 2024-07-05 | Stop reason: HOSPADM

## 2024-07-03 RX ORDER — ACETAMINOPHEN 325 MG/1
975 TABLET ORAL EVERY 6 HOURS
Status: DISCONTINUED | OUTPATIENT
Start: 2024-07-03 | End: 2024-07-05 | Stop reason: HOSPADM

## 2024-07-03 RX ORDER — ENOXAPARIN SODIUM 100 MG/ML
40 INJECTION SUBCUTANEOUS EVERY 24 HOURS
Status: DISCONTINUED | OUTPATIENT
Start: 2024-07-04 | End: 2024-07-05 | Stop reason: HOSPADM

## 2024-07-03 RX ORDER — METHYLERGONOVINE MALEATE 0.2 MG/ML
0.2 INJECTION INTRAVENOUS ONCE AS NEEDED
Status: DISCONTINUED | OUTPATIENT
Start: 2024-07-03 | End: 2024-07-05 | Stop reason: HOSPADM

## 2024-07-03 RX ORDER — LEVOTHYROXINE SODIUM 112 UG/1
112 TABLET ORAL
Status: DISCONTINUED | OUTPATIENT
Start: 2024-07-04 | End: 2024-07-04

## 2024-07-03 RX ORDER — FAMOTIDINE 10 MG/ML
20 INJECTION INTRAVENOUS ONCE
Status: COMPLETED | OUTPATIENT
Start: 2024-07-03 | End: 2024-07-03

## 2024-07-03 RX ORDER — NIFEDIPINE 10 MG/1
10 CAPSULE ORAL ONCE AS NEEDED
Status: DISCONTINUED | OUTPATIENT
Start: 2024-07-03 | End: 2024-07-03 | Stop reason: SDUPTHER

## 2024-07-03 RX ORDER — LABETALOL HYDROCHLORIDE 5 MG/ML
20 INJECTION, SOLUTION INTRAVENOUS ONCE AS NEEDED
Status: DISCONTINUED | OUTPATIENT
Start: 2024-07-03 | End: 2024-07-05 | Stop reason: HOSPADM

## 2024-07-03 RX ORDER — HYDRALAZINE HYDROCHLORIDE 20 MG/ML
5 INJECTION INTRAMUSCULAR; INTRAVENOUS ONCE AS NEEDED
Status: DISCONTINUED | OUTPATIENT
Start: 2024-07-03 | End: 2024-07-03 | Stop reason: SDUPTHER

## 2024-07-03 RX ORDER — SODIUM CITRATE AND CITRIC ACID MONOHYDRATE 334; 500 MG/5ML; MG/5ML
30 SOLUTION ORAL ONCE
Status: COMPLETED | OUTPATIENT
Start: 2024-07-03 | End: 2024-07-03

## 2024-07-03 RX ORDER — ACETAMINOPHEN 120 MG/1
SUPPOSITORY RECTAL AS NEEDED
Status: DISCONTINUED | OUTPATIENT
Start: 2024-07-03 | End: 2024-07-03

## 2024-07-03 RX ORDER — NALOXONE HYDROCHLORIDE 0.4 MG/ML
0.1 INJECTION, SOLUTION INTRAMUSCULAR; INTRAVENOUS; SUBCUTANEOUS EVERY 5 MIN PRN
Status: DISCONTINUED | OUTPATIENT
Start: 2024-07-03 | End: 2024-07-05 | Stop reason: HOSPADM

## 2024-07-03 RX ORDER — NIFEDIPINE 10 MG/1
10 CAPSULE ORAL ONCE AS NEEDED
Status: DISCONTINUED | OUTPATIENT
Start: 2024-07-03 | End: 2024-07-05 | Stop reason: HOSPADM

## 2024-07-03 RX ORDER — IBUPROFEN 600 MG/1
600 TABLET ORAL EVERY 6 HOURS
Status: DISCONTINUED | OUTPATIENT
Start: 2024-07-04 | End: 2024-07-05 | Stop reason: HOSPADM

## 2024-07-03 RX ORDER — ONDANSETRON 4 MG/1
4 TABLET, FILM COATED ORAL EVERY 6 HOURS PRN
Status: DISCONTINUED | OUTPATIENT
Start: 2024-07-03 | End: 2024-07-03 | Stop reason: SDUPTHER

## 2024-07-03 RX ORDER — HYDROMORPHONE HYDROCHLORIDE 1 MG/ML
0.2 INJECTION, SOLUTION INTRAMUSCULAR; INTRAVENOUS; SUBCUTANEOUS EVERY 5 MIN PRN
Status: DISCONTINUED | OUTPATIENT
Start: 2024-07-03 | End: 2024-07-05 | Stop reason: HOSPADM

## 2024-07-03 RX ORDER — LABETALOL HYDROCHLORIDE 5 MG/ML
20 INJECTION, SOLUTION INTRAVENOUS ONCE AS NEEDED
Status: DISCONTINUED | OUTPATIENT
Start: 2024-07-03 | End: 2024-07-03 | Stop reason: SDUPTHER

## 2024-07-03 RX ORDER — OXYTOCIN/0.9 % SODIUM CHLORIDE 30/500 ML
60 PLASTIC BAG, INJECTION (ML) INTRAVENOUS ONCE AS NEEDED
Status: DISCONTINUED | OUTPATIENT
Start: 2024-07-03 | End: 2024-07-05 | Stop reason: HOSPADM

## 2024-07-03 RX ORDER — OXYTOCIN 10 [USP'U]/ML
10 INJECTION, SOLUTION INTRAMUSCULAR; INTRAVENOUS ONCE AS NEEDED
Status: DISCONTINUED | OUTPATIENT
Start: 2024-07-03 | End: 2024-07-03 | Stop reason: SDUPTHER

## 2024-07-03 RX ORDER — TERBUTALINE SULFATE 1 MG/ML
0.25 INJECTION SUBCUTANEOUS ONCE AS NEEDED
Status: DISCONTINUED | OUTPATIENT
Start: 2024-07-03 | End: 2024-07-03

## 2024-07-03 RX ORDER — ONDANSETRON 4 MG/1
4 TABLET, FILM COATED ORAL EVERY 6 HOURS PRN
Status: DISCONTINUED | OUTPATIENT
Start: 2024-07-03 | End: 2024-07-05 | Stop reason: HOSPADM

## 2024-07-03 RX ORDER — BISACODYL 10 MG/1
10 SUPPOSITORY RECTAL DAILY PRN
Status: DISCONTINUED | OUTPATIENT
Start: 2024-07-03 | End: 2024-07-05 | Stop reason: HOSPADM

## 2024-07-03 RX ORDER — BUPIVACAINE HYDROCHLORIDE 7.5 MG/ML
INJECTION, SOLUTION INTRASPINAL AS NEEDED
Status: DISCONTINUED | OUTPATIENT
Start: 2024-07-03 | End: 2024-07-03

## 2024-07-03 RX ORDER — TRANEXAMIC ACID 100 MG/ML
1000 INJECTION, SOLUTION INTRAVENOUS ONCE AS NEEDED
Status: DISCONTINUED | OUTPATIENT
Start: 2024-07-03 | End: 2024-07-05 | Stop reason: HOSPADM

## 2024-07-03 RX ORDER — TRANEXAMIC ACID 100 MG/ML
1000 INJECTION, SOLUTION INTRAVENOUS ONCE AS NEEDED
Status: DISCONTINUED | OUTPATIENT
Start: 2024-07-03 | End: 2024-07-03 | Stop reason: SDUPTHER

## 2024-07-03 RX ORDER — SIMETHICONE 80 MG
80 TABLET,CHEWABLE ORAL 4 TIMES DAILY PRN
Status: DISCONTINUED | OUTPATIENT
Start: 2024-07-03 | End: 2024-07-05 | Stop reason: HOSPADM

## 2024-07-03 RX ORDER — CARBOPROST TROMETHAMINE 250 UG/ML
250 INJECTION, SOLUTION INTRAMUSCULAR ONCE AS NEEDED
Status: DISCONTINUED | OUTPATIENT
Start: 2024-07-03 | End: 2024-07-03 | Stop reason: SDUPTHER

## 2024-07-03 RX ORDER — METHYLERGONOVINE MALEATE 0.2 MG/ML
0.2 INJECTION INTRAVENOUS ONCE AS NEEDED
Status: DISCONTINUED | OUTPATIENT
Start: 2024-07-03 | End: 2024-07-03 | Stop reason: SDUPTHER

## 2024-07-03 RX ORDER — LIDOCAINE HYDROCHLORIDE 10 MG/ML
30 INJECTION INFILTRATION; PERINEURAL ONCE AS NEEDED
Status: DISCONTINUED | OUTPATIENT
Start: 2024-07-03 | End: 2024-07-05 | Stop reason: HOSPADM

## 2024-07-03 RX ORDER — METOCLOPRAMIDE 10 MG/1
10 TABLET ORAL EVERY 6 HOURS PRN
Status: DISCONTINUED | OUTPATIENT
Start: 2024-07-03 | End: 2024-07-05 | Stop reason: HOSPADM

## 2024-07-03 RX ORDER — DIPHENHYDRAMINE HYDROCHLORIDE 50 MG/ML
25 INJECTION INTRAMUSCULAR; INTRAVENOUS EVERY 4 HOURS PRN
Status: DISCONTINUED | OUTPATIENT
Start: 2024-07-03 | End: 2024-07-05 | Stop reason: HOSPADM

## 2024-07-03 RX ORDER — OXYTOCIN 10 [USP'U]/ML
10 INJECTION, SOLUTION INTRAMUSCULAR; INTRAVENOUS ONCE AS NEEDED
Status: DISCONTINUED | OUTPATIENT
Start: 2024-07-03 | End: 2024-07-05 | Stop reason: HOSPADM

## 2024-07-03 RX ORDER — ADHESIVE BANDAGE
10 BANDAGE TOPICAL
Status: DISCONTINUED | OUTPATIENT
Start: 2024-07-03 | End: 2024-07-05 | Stop reason: HOSPADM

## 2024-07-03 RX ORDER — LIDOCAINE 560 MG/1
1 PATCH PERCUTANEOUS; TOPICAL; TRANSDERMAL
Status: DISCONTINUED | OUTPATIENT
Start: 2024-07-03 | End: 2024-07-05 | Stop reason: HOSPADM

## 2024-07-03 RX ORDER — OXYTOCIN/0.9 % SODIUM CHLORIDE 30/500 ML
60 PLASTIC BAG, INJECTION (ML) INTRAVENOUS ONCE AS NEEDED
Status: DISCONTINUED | OUTPATIENT
Start: 2024-07-03 | End: 2024-07-03 | Stop reason: SDUPTHER

## 2024-07-03 RX ORDER — SODIUM CHLORIDE, SODIUM LACTATE, POTASSIUM CHLORIDE, CALCIUM CHLORIDE 600; 310; 30; 20 MG/100ML; MG/100ML; MG/100ML; MG/100ML
125 INJECTION, SOLUTION INTRAVENOUS CONTINUOUS
Status: DISCONTINUED | OUTPATIENT
Start: 2024-07-03 | End: 2024-07-03

## 2024-07-03 RX ORDER — MISOPROSTOL 200 UG/1
800 TABLET ORAL ONCE AS NEEDED
Status: DISCONTINUED | OUTPATIENT
Start: 2024-07-03 | End: 2024-07-05 | Stop reason: HOSPADM

## 2024-07-03 RX ORDER — METFORMIN HYDROCHLORIDE 500 MG/1
500 TABLET ORAL EVERY 12 HOURS
Status: DISCONTINUED | OUTPATIENT
Start: 2024-07-03 | End: 2024-07-05 | Stop reason: HOSPADM

## 2024-07-03 RX ORDER — PHENYLEPHRINE HCL IN 0.9% NACL 1 MG/10 ML
SYRINGE (ML) INTRAVENOUS AS NEEDED
Status: DISCONTINUED | OUTPATIENT
Start: 2024-07-03 | End: 2024-07-03

## 2024-07-03 RX ORDER — ONDANSETRON HYDROCHLORIDE 2 MG/ML
4 INJECTION, SOLUTION INTRAVENOUS EVERY 6 HOURS PRN
Status: DISCONTINUED | OUTPATIENT
Start: 2024-07-03 | End: 2024-07-03 | Stop reason: SDUPTHER

## 2024-07-03 RX ORDER — METOCLOPRAMIDE HYDROCHLORIDE 5 MG/ML
10 INJECTION INTRAMUSCULAR; INTRAVENOUS ONCE
Status: COMPLETED | OUTPATIENT
Start: 2024-07-03 | End: 2024-07-03

## 2024-07-03 RX ORDER — DIPHENHYDRAMINE HCL 25 MG
25 CAPSULE ORAL EVERY 4 HOURS PRN
Status: DISCONTINUED | OUTPATIENT
Start: 2024-07-03 | End: 2024-07-05 | Stop reason: HOSPADM

## 2024-07-03 RX ORDER — LOPERAMIDE HYDROCHLORIDE 2 MG/1
4 CAPSULE ORAL EVERY 2 HOUR PRN
Status: DISCONTINUED | OUTPATIENT
Start: 2024-07-03 | End: 2024-07-05 | Stop reason: HOSPADM

## 2024-07-03 RX ORDER — LOPERAMIDE HYDROCHLORIDE 2 MG/1
4 CAPSULE ORAL EVERY 2 HOUR PRN
Status: DISCONTINUED | OUTPATIENT
Start: 2024-07-03 | End: 2024-07-03 | Stop reason: SDUPTHER

## 2024-07-03 RX ORDER — OXYCODONE HYDROCHLORIDE 5 MG/1
10 TABLET ORAL EVERY 4 HOURS PRN
Status: DISCONTINUED | OUTPATIENT
Start: 2024-07-04 | End: 2024-07-05 | Stop reason: HOSPADM

## 2024-07-03 RX ORDER — CEFAZOLIN SODIUM 2 G/100ML
2 INJECTION, SOLUTION INTRAVENOUS ONCE
Status: COMPLETED | OUTPATIENT
Start: 2024-07-03 | End: 2024-07-03

## 2024-07-03 RX ORDER — MORPHINE SULFATE 0.5 MG/ML
INJECTION, SOLUTION EPIDURAL; INTRATHECAL; INTRAVENOUS AS NEEDED
Status: DISCONTINUED | OUTPATIENT
Start: 2024-07-03 | End: 2024-07-03

## 2024-07-03 RX ORDER — ONDANSETRON HYDROCHLORIDE 2 MG/ML
4 INJECTION, SOLUTION INTRAVENOUS EVERY 6 HOURS PRN
Status: DISCONTINUED | OUTPATIENT
Start: 2024-07-03 | End: 2024-07-05 | Stop reason: HOSPADM

## 2024-07-03 RX ORDER — MISOPROSTOL 200 UG/1
800 TABLET ORAL ONCE AS NEEDED
Status: DISCONTINUED | OUTPATIENT
Start: 2024-07-03 | End: 2024-07-03 | Stop reason: SDUPTHER

## 2024-07-03 RX ORDER — HYDROMORPHONE HYDROCHLORIDE 1 MG/ML
0.2 INJECTION, SOLUTION INTRAMUSCULAR; INTRAVENOUS; SUBCUTANEOUS EVERY 5 MIN PRN
Status: DISCONTINUED | OUTPATIENT
Start: 2024-07-03 | End: 2024-07-03

## 2024-07-03 RX ORDER — SODIUM CHLORIDE, SODIUM LACTATE, POTASSIUM CHLORIDE, CALCIUM CHLORIDE 600; 310; 30; 20 MG/100ML; MG/100ML; MG/100ML; MG/100ML
125 INJECTION, SOLUTION INTRAVENOUS CONTINUOUS
Status: DISCONTINUED | OUTPATIENT
Start: 2024-07-03 | End: 2024-07-05 | Stop reason: HOSPADM

## 2024-07-03 SDOH — SOCIAL STABILITY: SOCIAL INSECURITY: DOES ANYONE TRY TO KEEP YOU FROM HAVING/CONTACTING OTHER FRIENDS OR DOING THINGS OUTSIDE YOUR HOME?: NO

## 2024-07-03 SDOH — HEALTH STABILITY: MENTAL HEALTH: WISH TO BE DEAD (PAST 1 MONTH): NO

## 2024-07-03 SDOH — HEALTH STABILITY: MENTAL HEALTH: WERE YOU ABLE TO COMPLETE ALL THE BEHAVIORAL HEALTH SCREENINGS?: YES

## 2024-07-03 SDOH — SOCIAL STABILITY: SOCIAL INSECURITY: HAS ANYONE EVER THREATENED TO HURT YOUR FAMILY OR YOUR PETS?: NO

## 2024-07-03 SDOH — HEALTH STABILITY: MENTAL HEALTH: HAVE YOU USED ANY SUBSTANCES (CANABIS, COCAINE, HEROIN, HALLUCINOGENS, INHALANTS, ETC.) IN THE PAST 12 MONTHS?: NO

## 2024-07-03 SDOH — SOCIAL STABILITY: SOCIAL INSECURITY: ARE YOU OR HAVE YOU BEEN THREATENED OR ABUSED PHYSICALLY, EMOTIONALLY, OR SEXUALLY BY ANYONE?: NO

## 2024-07-03 SDOH — SOCIAL STABILITY: SOCIAL INSECURITY: DO YOU FEEL ANYONE HAS EXPLOITED OR TAKEN ADVANTAGE OF YOU FINANCIALLY OR OF YOUR PERSONAL PROPERTY?: NO

## 2024-07-03 SDOH — SOCIAL STABILITY: SOCIAL INSECURITY: HAVE YOU HAD THOUGHTS OF HARMING ANYONE ELSE?: NO

## 2024-07-03 SDOH — SOCIAL STABILITY: SOCIAL INSECURITY: ABUSE SCREEN: ADULT

## 2024-07-03 SDOH — SOCIAL STABILITY: SOCIAL INSECURITY: VERBAL ABUSE: DENIES

## 2024-07-03 SDOH — SOCIAL STABILITY: SOCIAL INSECURITY: ARE THERE ANY APPARENT SIGNS OF INJURIES/BEHAVIORS THAT COULD BE RELATED TO ABUSE/NEGLECT?: NO

## 2024-07-03 SDOH — SOCIAL STABILITY: SOCIAL INSECURITY: PHYSICAL ABUSE: DENIES

## 2024-07-03 SDOH — HEALTH STABILITY: MENTAL HEALTH: SUICIDAL BEHAVIOR (LIFETIME): NO

## 2024-07-03 SDOH — ECONOMIC STABILITY: HOUSING INSECURITY: DO YOU FEEL UNSAFE GOING BACK TO THE PLACE WHERE YOU ARE LIVING?: NO

## 2024-07-03 SDOH — HEALTH STABILITY: MENTAL HEALTH: NON-SPECIFIC ACTIVE SUICIDAL THOUGHTS (PAST 1 MONTH): NO

## 2024-07-03 SDOH — SOCIAL STABILITY: SOCIAL INSECURITY: HAVE YOU HAD ANY THOUGHTS OF HARMING ANYONE ELSE?: NO

## 2024-07-03 SDOH — HEALTH STABILITY: MENTAL HEALTH: STRENGTHS (MUST CHOOSE TWO): SUPPORT FROM FRIENDS;SUPPORT FROM FAMILY

## 2024-07-03 SDOH — HEALTH STABILITY: MENTAL HEALTH: HAVE YOU USED ANY PRESCRIPTION DRUGS OTHER THAN PRESCRIBED IN THE PAST 12 MONTHS?: NO

## 2024-07-03 ASSESSMENT — PATIENT HEALTH QUESTIONNAIRE - PHQ9
SUM OF ALL RESPONSES TO PHQ9 QUESTIONS 1 & 2: 0
1. LITTLE INTEREST OR PLEASURE IN DOING THINGS: NOT AT ALL
2. FEELING DOWN, DEPRESSED OR HOPELESS: NOT AT ALL

## 2024-07-03 ASSESSMENT — PAIN SCALES - GENERAL
PAIN_LEVEL: 0
PAINLEVEL_OUTOF10: 0 - NO PAIN

## 2024-07-03 ASSESSMENT — LIFESTYLE VARIABLES
HOW OFTEN DO YOU HAVE A DRINK CONTAINING ALCOHOL: NEVER
SKIP TO QUESTIONS 9-10: 1
HOW MANY STANDARD DRINKS CONTAINING ALCOHOL DO YOU HAVE ON A TYPICAL DAY: PATIENT DOES NOT DRINK
AUDIT-C TOTAL SCORE: 0
AUDIT-C TOTAL SCORE: 0
HOW OFTEN DO YOU HAVE 6 OR MORE DRINKS ON ONE OCCASION: NEVER

## 2024-07-03 ASSESSMENT — ACTIVITIES OF DAILY LIVING (ADL): LACK_OF_TRANSPORTATION: NO

## 2024-07-03 NOTE — LETTER
July 5, 2024     Patient: Fatuma Dang   YOB: 2000   Date of Visit: 7/3/24       To Whom It May Concern:    Fatuma Dang and  were seen in my clinic on 7/3-7/5 at [facility]. Please excuse Fatuma and her  Brayan Dang for her absence from work on these days.    If you have any questions or concerns, please don't hesitate to call.  [facility] Labor and Delivery [phone]       Sincerely,       _____________________________________________________  Maria Antonia ROWLEY RN  [facility]

## 2024-07-03 NOTE — ANESTHESIA POSTPROCEDURE EVALUATION
Patient: Fatuma Dang    Procedure Summary       Date: 24 Room / Location: [facility] OB 02 [facility]    Anesthesia Start: 1214 Anesthesia Stop: 1347    Procedures:        Section      Ligation Fallopian Tube (Bilateral) Diagnosis:       Uterine scar from previous  delivery affecting pregnancy ([facility])      (Uterine scar from previous  delivery affecting pregnancy ([facility]) [O34.219])    Surgeons: Yared Griffin MD Responsible Provider: BETTYE Carl    Anesthesia Type: spinal ASA Status: 2            Anesthesia Type: spinal    Vitals Value Taken Time   /56 24 1344   Temp 36.5 24 1347   Pulse 76 24 1345   Resp 16 24 1347   SpO2 96 % 24 1345       Anesthesia Post Evaluation    Patient location during evaluation: bedside  Patient participation: complete - patient participated  Level of consciousness: awake and alert  Pain score: 0  Pain management: adequate  Airway patency: patent  Cardiovascular status: acceptable  Respiratory status: acceptable  Hydration status: acceptable  Postoperative Nausea and Vomiting: none        No notable events documented.

## 2024-07-03 NOTE — OP NOTE
Date: 7/3/2024  OR Location: [facility] OB    Name: Fatuma Dang, : 2000, Age: 24 y.o., MRN: [id number], Sex: female    Diagnosis  Pre-op Diagnosis     * Uterine scar from previous  delivery affecting pregnancy ([facility]) [O34.219] Post-op Diagnosis     * Uterine scar from previous  delivery affecting pregnancy ([facility]) [O34.219]     Procedures   Section  58863 - TX  DELIVERY ONLY    Ligation Fallopian Tube  61649 - TX LIG/TRNSXJ FALOPIAN TUBE  DEL/ABDML SURG    TX SALPINGECTOMY COMPLETE/PARTIAL UNI/BI SPX [55143]  Surgeons      * Yared Griffin - Primary    Resident/Fellow/Other Assistant:  Surgeons and Role:     * Deisi Zelaya SA - Assisting     * Su Elena SA - Assisting    Procedure Summary  Anesthesia: Spinal  ASA: II  Anesthesia Staff: CRNA: ASHANTI Carl-CRNA  Estimated Blood Loss: 800mL  Intra-op Medications:   Administrations occurring from 1014 to 1339 on 24:   Medication Name Total Dose   lactated Ringer's infusion 327.44 mL   ceFAZolin in dextrose (iso-os) (Ancef) IVPB 2 g 2 g   famotidine PF (Pepcid) injection 20 mg 20 mg   metoclopramide (Reglan) injection 10 mg 10 mg   sodium citrate-citric acid (Bicitra) solution 30 mL 30 mL              Anesthesia Record               Intraprocedure I/O Totals          Intake    Oxytocin Drip 0.00 mL    The total shown is the total volume documented since Anesthesia Start was filed.    lactated Ringer's infusion 2000.00 mL    Total Intake 2000 mL       Output    Urine 600 mL    Total Blood Loss - Surgical Delivery (mL) 820 mL    Total Output 1420 mL       Net    Net Volume 580 mL       Other (could not be determined as input or output)    Surgical Delivery Estimated Blood Loss (mL) 820          Specimen: No specimens collected     Staff:            Informed Consent:  The risks, benefits, complications, and alternatives were discussed with the patient. The patient understood that the risks of   section include, but are not limited to: injury to nearby structures or organs, infection, blood loss and possible need for transfusion, and potential need for more surgery including hysterectomy. The patient stated understanding and desired to proceed. All questions were answered. The site of surgery was properly noted and marked. The patient was identified as Fatuma Dang and the procedure verified as a  delivery. A Time Out was held and the above information confirmed.    Procedure Details:  The patient was taken to the operating room where Spinal  anesthesia was found to be adequate. Antibiotics were given for infection prophylaxis. She was prepped and draped in the normal sterile fashion in the dorsal supine position with leftward tilt. A Pfannenstiel skin incision was made with scalpel. The incision was carried down to the fascia with bovie cauterization. The fascia was incised and extended laterally with Jeffries scissors. The inferior aspect of the fascia was grasped with Kocher clamps. The underlying rectus and pyramidalis muscles were dissected off with Jeffries scissors. In a similar fashion, the superior aspect of the fascia was elevated with Kocher clamps, and the rectus muscle was dissected off. The rectus muscles were  bluntly down the midline down to the level of the pubic symphysis. The peritoneum was identified and entered sharply. Peritoneal incision was extended superiorly and inferiorly with good visualization of the bladder.    The bladder blade was inserted, vesicouterine peritoneum was identified, and bladder flap created sharply. The lower uterine segment was then incised with a low transverse  incision and was extended bluntly. The amniotic sac was ruptured and Clear  color noted. The bladder blade was removed and the infant was delivered atraumatically using the usual maneuvers. The remainder of the infant was delivered, the cord clamped and cut, and the baby was  handed off to the awaiting clinicians.     The placenta was removed via manual massage. The uterus was then exteriorized and cleared of all clots and debris. The hysterotomy was repaired with suture of 0 Vicryl in a running locked fashion.     Attention was now turned to the tubes and both tubes were identified by following them to their fimbriated end.  Using the LigaSure device, the tube was transected off the mesosalpinx and we came across near the cornua.  In this fashion bilateral salpingectomy was performed.. The ovaries and tubes appeared normal bilaterally. The uterus, tubes, and ovaries were then returned to the abdomen and pelvis. The uterine incision was reinspected and found to be hemostatic. The subfascial spaces were inspected and noted to be hemostatic. The fascia was then reapproximated with a running suture of 0 pds. Subcutaneous tissue was brought together with interrupted sutures of 3-0 Vicryl.The skin was closed with 4-0 Vicryl sutures.    The patient tolerated the procedure well. Sponge, instrument, and needle counts were correct and the patient was taken to the recovery room in good and stable condition.    Findings: Normal uterus ovaries and tubes.    Complications:  None; patient tolerated the procedure well.     Disposition: PACU - hemodynamically stable.  Condition: stable

## 2024-07-03 NOTE — ANESTHESIA PREPROCEDURE EVALUATION
Patient: Fatuma Dang    Evaluation Method: In-person visit    Procedure Information       Date/Time: 24 1000    Procedures:        Section - R C/S x 2 @ 39.1 wks and PPTL      Ligation Fallopian Tube (Bilateral)    Location: [facility] OB 02 / [facility] OB    Surgeons: Yared Griffin MD            Relevant Problems   Endocrine   (+) Hypothyroidism affecting pregnancy in third trimester ([facility])   (+) Obesity affecting pregnancy in third trimester ([facility])      GYN   (+) 38 weeks gestation of pregnancy ([facility])   (+) 39 weeks gestation of pregnancy ([facility])       Clinical information reviewed:    Allergies  Meds               NPO Detail:  NPO/Void Status  Date of Last Liquid: 24  Time of Last Liquid: 08  Date of Last Solid: 24  Time of Last Solid:          OB/Gyn Evaluation    Present Pregnancy    (+) , previous  section - repeat   Obstetric History                Physical Exam    Airway  Mallampati: II     Cardiovascular - normal exam     Dental - normal exam     Pulmonary - normal exam     Abdominal - normal exam             Anesthesia Plan    History of general anesthesia?: yes  History of complications of general anesthesia?: no    ASA 2     spinal   (I informed and discussed the risks and benefits of general, and spinal anesthesia with the patient.  The patient expressed her understanding and her questions were answered.  A verbal consent was given by the patient.   )  Anesthetic plan and risks discussed with patient and spouse.  Use of blood products discussed with patient and spouse who consented to blood products.    Plan discussed with CRNA.

## 2024-07-03 NOTE — H&P
Obstetrical Admission History and Physical     Fatuma Dang is a 24 y.o. . Presents for RLTCS and bilateral salpingectomy    Chief Complaint: Scheduled     Assessment/Plan    39 week IUP  Prior c/s   Admit for RLTCS and salpingectomy.  Alt/R/B disc and all Q's answered    Principal Problem:    Uterine scar from previous  delivery affecting pregnancy ([facility])  Active Problems:    39 weeks gestation of pregnancy ([facility])      Pregnancy Problems (from 23 to present)       Problem Noted Resolved    39 weeks gestation of pregnancy ([facility]) 7/3/2024 by Yared Griffin MD No    Priority:  Medium      SGA (small for gestational age), fetal, affecting care of mother, antepartum ([facility]) 2024 by Angeline Perez MD No    Priority:  Medium      Overview Addendum 2024  2:35 PM by Angeline Perez MD     Will get EFW for SGA.  35 week EFW is AGA 2562g, 46%.          Hypothyroidism affecting pregnancy in third trimester ([facility]) 2023 by Angeline Perez MD No    Priority:  Medium      Overview Addendum 2024  3:45 PM by Valarie Basurto RN     Plan TSH each trimester with goal of 2.5 or less.  TSH 2.97 on 2023.  TSH 0.91 on 2024 at 15 weeks.  TSH 1.83 on 3/21  TSH 1.80 on          Obesity affecting pregnancy in third trimester ([facility]) 2023 by Angeline Perez MD No    Priority:  Medium      Overview Addendum 2024  2:35 PM by Angeline Perez MD     Starting BMI 31.   Was on metformin at conception. Plan to stop at 12 weeks and proceed with glucola. Hgb A1c 5.2%.  Plan EFW at 30 and 36 weeks.   35 week EFW 2562g, 46%.         38 weeks gestation of pregnancy ([facility]) 2023 by Angeline Perez MD No    Priority:  Medium      Overview Addendum 2024  8:18 AM by Valarie Basurto RN     Glucola 98.  Pediatrician Dr. Butch Badgett         Uterine scar from previous  delivery affecting pregnancy ([facility]) 2023 by  Angeline Perez MD No    Priority:  Medium      Overview Addendum 2024  7:27 AM by Angeline Perez MD     Progressed to 9 cm then arrest of dilation. She desires 39 week repeat CS with BS.   Repeat CS with BS is 7/3/2024.         Rh negative state in antepartum period ([facility]) 2023 by Angeline Perez MD No    Priority:  Medium              Obstetrical History   OB History    Para Term  AB Living   2 1 1     1   SAB IAB Ectopic Multiple Live Births           1      # Outcome Date GA Lbr Harvinder/2nd Weight Sex Delivery Anes PTL Lv   2 Current            1 Term 22 40w0d  3.629 kg M CS-LTranv EPI N TED       Past Medical History  Past Medical History:   Diagnosis Date    Hypothyroidism     Insulin resistance     Polycystic ovary syndrome     Unspecified disorder of eyelid 2014    Eyelid lesion, benign        Past Surgical History   Past Surgical History:   Procedure Laterality Date    OTHER SURGICAL HISTORY  2022     section low transverse       Social History  Social History     Tobacco Use    Smoking status: Never    Smokeless tobacco: Never   Substance Use Topics    Alcohol use: Never     Substance and Sexual Activity   Drug Use Never       Allergies  Patient has no known allergies.     Medications  Medications Prior to Admission   Medication Sig Dispense Refill Last Dose    levothyroxine (Synthroid) 112 mcg tablet Take 1 tablet (112 mcg) by mouth once daily in the morning. Take before meals. 30 tablet 11     metFORMIN (Glucophage) 500 mg tablet Take 1 tablet (500 mg) by mouth every 12 hours. Take with food.       prenatal no115/iron/folic acid (PRENATAL 19 ORAL) Take by mouth.          Objective    Last Vitals  Temp Pulse Resp BP MAP O2 Sat   36.7 °C (98.1 °F) 81 18 119/64   96 %     Physical Examination  GENERAL: Examination reveals a well developed, well nourished, gravid female in no acute distress. She is alert and cooperative.  LUNGS: clear to  auscultation bilaterally  HEART: regular rate and rhythm, S1, S2 normal, no murmur, click, rub or gallop  ABDOMEN: soft, gravid, nontender, nondistended, no abnormal masses, no epigastric pain  FHR is  140, with  ,mod variability (6-25 bpm)  EXTREMITIES: no redness or tenderness in the calves or thighs, no edema  PSYCHOLOGICAL: awake and alert; oriented to person, place, and time

## 2024-07-03 NOTE — ANESTHESIA PROCEDURE NOTES
Spinal Block    Patient location during procedure: OB  Start time: 7/3/2024 12:18 PM  End time: 7/3/2024 12:21 PM  Reason for block: primary anesthetic  Staffing  Performed: CRNA   Authorized by: BETTYE Carl    Performed by: BETTYE Carl    Preanesthetic Checklist  Completed: patient identified, IV checked, risks and benefits discussed, surgical consent, monitors and equipment checked, pre-op evaluation, timeout performed and sterile techniques followed  Block Timeout  RN/Licensed healthcare professional reads aloud to the Anesthesia provider and entire team: Patient identity, procedure with side and site, patient position, and as applicable the availability of implants/special equipment/special requirements.  Patient on coagulant treatment: no  Timeout performed at: 7/3/2024 12:18 PM  Spinal Block  Patient position: sitting  Prep: ChloraPrep  Sterility prep: cap, gloves, hand hygiene and mask  Sedation level: no sedation  Patient monitoring: blood pressure, continuous pulse oximetry and EKG  Approach: midline  Vertebral space: L3-4  Injection technique: single-shot  Needle  Needle type: pencil-point   Needle gauge: 24 G  Needle length: 3.5 in  Free flowing CSF: yes    Assessment  Sensory level: T4  Block outcome: Allis test negative  Procedure assessment: patient tolerated procedure well with no immediate complications

## 2024-07-04 LAB
ERYTHROCYTE [DISTWIDTH] IN BLOOD BY AUTOMATED COUNT: 13.7 % (ref 11.5–14.5)
HCT VFR BLD AUTO: 31.1 % (ref 36–46)
HGB BLD-MCNC: 10.3 G/DL (ref 12–16)
MCH RBC QN AUTO: 30.9 PG (ref 26–34)
MCHC RBC AUTO-ENTMCNC: 33.1 G/DL (ref 32–36)
MCV RBC AUTO: 93 FL (ref 80–100)
NRBC BLD-RTO: 0 /100 WBCS (ref 0–0)
PLATELET # BLD AUTO: 170 X10*3/UL (ref 150–450)
RBC # BLD AUTO: 3.33 X10*6/UL (ref 4–5.2)
WBC # BLD AUTO: 19.9 X10*3/UL (ref 4.4–11.3)

## 2024-07-04 PROCEDURE — 2500000004 HC RX 250 GENERAL PHARMACY W/ HCPCS (ALT 636 FOR OP/ED): Performed by: OBSTETRICS & GYNECOLOGY

## 2024-07-04 PROCEDURE — 36415 COLL VENOUS BLD VENIPUNCTURE: CPT | Performed by: OBSTETRICS & GYNECOLOGY

## 2024-07-04 PROCEDURE — 2500000001 HC RX 250 WO HCPCS SELF ADMINISTERED DRUGS (ALT 637 FOR MEDICARE OP): Performed by: OBSTETRICS & GYNECOLOGY

## 2024-07-04 PROCEDURE — 1100000001 HC PRIVATE ROOM DAILY

## 2024-07-04 PROCEDURE — 85027 COMPLETE CBC AUTOMATED: CPT | Performed by: OBSTETRICS & GYNECOLOGY

## 2024-07-04 RX ORDER — LEVOTHYROXINE SODIUM 75 UG/1
112.5 TABLET ORAL
Status: DISCONTINUED | OUTPATIENT
Start: 2024-07-05 | End: 2024-07-05 | Stop reason: HOSPADM

## 2024-07-04 RX ORDER — LEVOTHYROXINE SODIUM 75 UG/1
112.5 TABLET ORAL ONCE
Status: DISCONTINUED | OUTPATIENT
Start: 2024-07-04 | End: 2024-07-05 | Stop reason: HOSPADM

## 2024-07-04 RX ORDER — FERROUS SULFATE 325(65) MG
65 TABLET ORAL
Status: DISCONTINUED | OUTPATIENT
Start: 2024-07-04 | End: 2024-07-05 | Stop reason: HOSPADM

## 2024-07-04 ASSESSMENT — PAIN DESCRIPTION - LOCATION: LOCATION: ABDOMEN

## 2024-07-04 ASSESSMENT — PAIN SCALES - GENERAL
PAINLEVEL_OUTOF10: 0 - NO PAIN
PAINLEVEL_OUTOF10: 5 - MODERATE PAIN
PAINLEVEL_OUTOF10: 5 - MODERATE PAIN
PAINLEVEL_OUTOF10: 3
PAINLEVEL_OUTOF10: 2
PAINLEVEL_OUTOF10: 0 - NO PAIN

## 2024-07-04 NOTE — LACTATION NOTE
Lactation Consultant Note  Lactation Consultation  Reason for Consult: Initial assessment  Consultant Name: Ladi HDZ    Maternal Information  Has mother  before?: Yes  Infant to breast within first 2 hours of birth?: Yes  Exclusive Pump and Bottle Feed: No    Maternal Assessment       Infant Assessment       Feeding Assessment  Nutrition Source: Breastmilk  Feeding Method: Nursing at the breast    LATCH TOOL       Breast Pump       Other OB Lactation Tools       Patient Follow-up       Other OB Lactation Documentation       Recommendations/Summary  I did not observe a  feed this visit. Mom is an experienced breast feeder who reports that feeds are going well. Reviewed milk supply patterns and  feeding patterns in the fist and second 24 HOL.Mom was asked to attempt/feed baby at least every 2-3 hours or on demand with a goal of 8-12 feeds daily. Feeding cues reviewed. Mom states that she is able to latch baby independently. We reviewed some breastfeeding education. Mom has no further questions at this time. Will follow up as needed. Outpatient lactation discussed. Mom will follow up as needed.

## 2024-07-04 NOTE — PROGRESS NOTES
Postpartum Progress Note    Assessment/Plan   Fatuma Dang is a 24 y.o., , who delivered at 39w1d gestation and is now postpartum day 1.  She is sleeping now,  denies any headaches or concerns.  No flatus yet, vaginal bleeding minimum.  Pain controllod.  Breast feeding well.  Plan to go home on POD # 3.  Will start on Iron supplements for miold anemia.      Principal Problem:    Uterine scar from previous  delivery affecting pregnancy ([facility])  Active Problems:    39 weeks gestation of pregnancy ([facility])    Pregnancy Problems (from 23 to present)       Problem Noted Resolved    39 weeks gestation of pregnancy ([facility]) 7/3/2024 by Yared Griffin MD No    Priority:  Medium      SGA (small for gestational age), fetal, affecting care of mother, antepartum ([facility]) 2024 by Angeline Perez MD No    Priority:  Medium      Overview Addendum 2024  2:35 PM by Angeline Perez MD     Will get EFW for SGA.  35 week EFW is AGA 2562g, 46%.          Hypothyroidism affecting pregnancy in third trimester ([facility]) 2023 by Angeline Perez MD No    Priority:  Medium      Overview Addendum 2024  3:45 PM by Valarie Basurto RN     Plan TSH each trimester with goal of 2.5 or less.  TSH 2.97 on 2023.  TSH 0.91 on 2024 at 15 weeks.  TSH 1.83 on 3/21  TSH 1.80 on          Obesity affecting pregnancy in third trimester ([facility]) 2023 by Angeline Perez MD No    Priority:  Medium      Overview Addendum 2024  2:35 PM by Angeline Perez MD     Starting BMI 31.   Was on metformin at conception. Plan to stop at 12 weeks and proceed with glucola. Hgb A1c 5.2%.  Plan EFW at 30 and 36 weeks.   35 week EFW 2562g, 46%.         38 weeks gestation of pregnancy ([facility]) 2023 by Angeline Perez MD No    Priority:  Medium      Overview Addendum 2024  8:18 AM by Valarie Basurto RN     Glucola 98.  Pediatrician Dr. Butch Dow         Uterine scar  from previous  delivery affecting pregnancy ([facility]) 2023 by Angeline Perez MD No    Priority:  Medium      Overview Addendum 2024  7:27 AM by Angeline Perez MD     Progressed to 9 cm then arrest of dilation. She desires 39 week repeat CS with BS.   Repeat CS with BS is 7/3/2024.         Rh negative state in antepartum period ([facility]) 2023 by Angeline Perez MD No    Priority:  Medium            Hospital course: no complications   section delivery  Patient is currently breastfeedingThe patient's blood type is B NEG. The baby's blood type is B NEG . Rhogam is not indicated.    Subjective   Her pain is well controlled with current medications  She is not passing flatus  She is ambulating well  She is tolerating a Adult diet Regular  She reports no breast or nursing problems  She denies emotional concerns today   Her plan for contraception is tubal ligation   No chills    Objective   Allergies:   Patient has no known allergies.         Last Vitals:  Temp Pulse Resp BP MAP Pulse Ox   36.4 °C (97.5 °F) 75 16 116/67   99 %     Vitals Min/Max Last 24 Hours:  Temp  Min: 36.4 °C (97.5 °F)  Max: 37.2 °C (98.9 °F)  Pulse  Min: 52  Max: 94  Resp  Min: 16  Max: 19  BP  Min: 104/71  Max: 130/62    Intake/Output:     Intake/Output Summary (Last 24 hours) at 2024 0746  Last data filed at 2024 0500  Gross per 24 hour   Intake 2000 ml   Output 2470 ml   Net -470 ml       Physical Exam:  General: Examination reveals a well developed, well nourished, female, in no acute distress. She is alert and cooperative.  Lungs: clear to auscultation bilaterally.  Abdomen: soft, fundus firm and down.  Incision C/D/I.  Incision: healing well.  Fundus: firm.  Extremities: no redness or tenderness in the calves or thighs, no edema.  Psychological: Seems resting, and happy.  .      Lab Data:  Lab Results   Component Value Date    WBC 19.9 (H) 2024    HGB 10.3 (L) 2024    HCT 31.1 (L)  07/04/2024     07/04/2024

## 2024-07-04 NOTE — CARE PLAN
Problem: Skin  Goal: Decreased wound size/increased tissue granulation at next dressing change  Outcome: Progressing  Goal: Participates in plan/prevention/treatment measures  Outcome: Progressing  Goal: Prevent/manage excess moisture  Outcome: Progressing  Goal: Prevent/minimize sheer/friction injuries  Outcome: Progressing  Goal: Promote/optimize nutrition  Outcome: Progressing  Goal: Promote skin healing  Outcome: Progressing     Problem: Postpartum  Goal: Experiences normal postpartum course  Outcome: Progressing  Goal: Incisions, wounds, or drain sites healing without S/S of infection  Outcome: Progressing  Goal: No s/sx of hemorrhage  Outcome: Progressing     Problem: Pain - Adult  Goal: Verbalizes/displays adequate comfort level or baseline comfort level  Outcome: Progressing     Problem: Safety - Adult  Goal: Free from fall injury  Outcome: Progressing     Problem: Discharge Planning  Goal: Discharge to home or other facility with appropriate resources  Outcome: Progressing       The clinical goals for the shift include patient will be safe, HDS, and have matenal bonding with child

## 2024-07-05 ENCOUNTER — PHARMACY VISIT (OUTPATIENT)
Dept: PHARMACY | Facility: CLINIC | Age: 24
End: 2024-07-05
Payer: MEDICARE

## 2024-07-05 VITALS
DIASTOLIC BLOOD PRESSURE: 79 MMHG | OXYGEN SATURATION: 98 % | RESPIRATION RATE: 16 BRPM | WEIGHT: 192 LBS | TEMPERATURE: 97.3 F | HEIGHT: 65 IN | BODY MASS INDEX: 31.99 KG/M2 | SYSTOLIC BLOOD PRESSURE: 123 MMHG | HEART RATE: 77 BPM

## 2024-07-05 LAB
BLOOD EXPIRATION DATE: NORMAL
DISPENSE STATUS: NORMAL
PRODUCT BLOOD TYPE: 1700
PRODUCT CODE: NORMAL
UNIT ABO: NORMAL
UNIT NUMBER: NORMAL
UNIT RH: NORMAL
UNIT VOLUME: 350
XM INTEP: NORMAL

## 2024-07-05 PROCEDURE — 90471 IMMUNIZATION ADMIN: CPT | Performed by: OBSTETRICS & GYNECOLOGY

## 2024-07-05 PROCEDURE — 2500000001 HC RX 250 WO HCPCS SELF ADMINISTERED DRUGS (ALT 637 FOR MEDICARE OP): Performed by: OBSTETRICS & GYNECOLOGY

## 2024-07-05 PROCEDURE — 90707 MMR VACCINE SC: CPT | Performed by: OBSTETRICS & GYNECOLOGY

## 2024-07-05 PROCEDURE — 2500000004 HC RX 250 GENERAL PHARMACY W/ HCPCS (ALT 636 FOR OP/ED): Performed by: OBSTETRICS & GYNECOLOGY

## 2024-07-05 PROCEDURE — RXMED WILLOW AMBULATORY MEDICATION CHARGE

## 2024-07-05 RX ORDER — ACETAMINOPHEN 500 MG
1000 TABLET ORAL EVERY 6 HOURS PRN
Qty: 60 TABLET | Refills: 0 | Status: SHIPPED | OUTPATIENT
Start: 2024-07-05

## 2024-07-05 RX ORDER — IBUPROFEN 600 MG/1
600 TABLET ORAL EVERY 6 HOURS PRN
Qty: 30 TABLET | Refills: 0 | Status: SHIPPED | OUTPATIENT
Start: 2024-07-05

## 2024-07-05 ASSESSMENT — PAIN SCALES - GENERAL
PAINLEVEL_OUTOF10: 0 - NO PAIN
PAINLEVEL_OUTOF10: 3

## 2024-07-05 NOTE — CARE PLAN
The patient's goals for the shift include stop cluster feeding with better latches and have longer breaks    The clinical goals for the shift include ambulate x3 a day at a minimum in hallways    Over the shift, the patient did not make progress toward the following goals. Barriers to progression include baby's needs and over 7% weight loss. Recommendations to address these barriers include frequent skin to skin and assessment of babies need. Provide assistance with breast feeding.

## 2024-07-05 NOTE — NURSING NOTE
1500    This RN gives PP education to patient and .     Education includes-discharge instructions, post birth warning signs, pp bleeding, cold for breasts, pain control, follow up appts, signs and symptoms to call in for, summer-hot tubs/pools, pelvic rest, and more     Parents educated on  care. Refer to Harmony chart.     Harmony bands verified for discharge.      Parent is wheeled off unit in wheelchair, baby in car seat.    Patient and family escorted by hospital staff.

## 2024-07-05 NOTE — LACTATION NOTE
Lactation Consultant Note  Lactation Consultation  Reason for Consult: Follow-up assessment  Consultant Name: Ladi HDZ    Maternal Information  Has mother  before?: Yes  Infant to breast within first 2 hours of birth?: Yes  Exclusive Pump and Bottle Feed: No    Maternal Assessment       Infant Assessment       Feeding Assessment  Nutrition Source: Breastmilk  Feeding Method: Nursing at the breast    LATCH TOOL       Breast Pump       Other OB Lactation Tools       Patient Follow-up       Other OB Lactation Documentation       Recommendations/Summary  Mom reports that feeds are going well. Mom is able to latch baby independently. We reviewed some breastfeeding education. Mom has no further questions at this time. Outpatient lactation discussed. Mom will follow up as needed.

## 2024-07-05 NOTE — DISCHARGE SUMMARY
Discharge Summary    Admission Date: 7/3/2024  Discharge Date: 2024    Discharge Diagnosis  Uterine scar from previous  delivery affecting pregnancy ([facility])  PPD #2  Scheduled primary  section with no complications  BF with no complications    Hospital Course  Delivery Date: 7/3/2024  12:42 PM   Delivery type: , Low Transverse    GA at delivery: 39w1d  Outcome: Living   Anesthesia during delivery: Spinal   Intrapartum complications: None   Feeding method: Breastfeeding Status: Yes     Procedures: fran salpingectomy  Contraception at discharge: none    Plan  Reviewed self care, breastfeeding, postpartum plan of care. Discussed MEWS and when to notify a provider including increased bleeding, s/sx of PPD, DVT, Pre-e, and infection.   Pain well controlled, sent home with motrin/tylenol  Birth control plan: salpingectomy complete  RTC 2w for an incision check with primary OB provider  RTC 6w routine PP visit    Pertinent Physical Exam At Time of Discharge  General: Examination reveals a well developed, well nourished, female, in no acute distress. She is alert and cooperative.  Breasts: lactating, no erythema or tenderness, nipples normal.  Fundus: firm, below umbilicus, and nontender.  Extremities: no redness or tenderness in the calves or thighs, no edema, no limitation in range of motion.  Neurological: alert, oriented, normal speech, no focal findings or movement disorder noted.  Psychological: awake and alert; oriented to person, place, and time.  Incision: dressing clean, dry, intact. No apparent erythema or edema.     Discharge Meds     Your medication list        START taking these medications        Instructions Last Dose Given Next Dose Due   acetaminophen 500 mg tablet  Commonly known as: Tylenol      Take 2 tablets (1,000 mg) by mouth every 6 hours if needed for mild pain (1 - 3).       ibuprofen 600 mg tablet      Take 1 tablet (600 mg) by mouth every 6 hours if needed for mild  pain (1 - 3).              ASK your doctor about these medications        Instructions Last Dose Given Next Dose Due   levothyroxine 112 mcg tablet  Commonly known as: Synthroid      Take 1 tablet (112 mcg) by mouth once daily in the morning. Take before meals.       metFORMIN 500 mg tablet  Commonly known as: Glucophage           PRENATAL 19 ORAL                     Where to Get Your Medications        These medications were sent to [facility]  [address], [place] [address]      Hours: 8 AM to 6 PM Mon-Fri, 9 AM to 1 PM Saturday Phone: [phone]   acetaminophen 500 mg tablet  ibuprofen 600 mg tablet          Complications Requiring Follow-Up  None    Test Results Pending At Discharge  Pending Labs       Order Current Status    Surgical Pathology Exam - FALLOPIAN TUBE SALPINGECTOMY LEFT In process    Surgical Pathology Exam - FALLOPIAN TUBE SALPINGECTOMY RIGHT In process            Outpatient Follow-Up  Future Appointments   Date Time Provider Department Center   7/17/2024  9:40 AM Angeline Perez MD [id number]   8/16/2024 11:45 AM Randal King MD [id number]           Fatuma Davis APRN-ISABELM, SNM, RN

## 2024-07-09 LAB
LABORATORY COMMENT REPORT: NORMAL
LABORATORY COMMENT REPORT: NORMAL
PATH REPORT.FINAL DX SPEC: NORMAL
PATH REPORT.FINAL DX SPEC: NORMAL
PATH REPORT.GROSS SPEC: NORMAL
PATH REPORT.GROSS SPEC: NORMAL
PATH REPORT.RELEVANT HX SPEC: NORMAL
PATH REPORT.RELEVANT HX SPEC: NORMAL
PATH REPORT.TOTAL CANCER: NORMAL
PATH REPORT.TOTAL CANCER: NORMAL

## 2024-07-11 ENCOUNTER — APPOINTMENT (OUTPATIENT)
Dept: OBSTETRICS AND GYNECOLOGY | Facility: CLINIC | Age: 24
End: 2024-07-11
Payer: COMMERCIAL

## 2024-07-13 PROBLEM — Z3A.38 38 WEEKS GESTATION OF PREGNANCY (HHS-HCC): Status: RESOLVED | Noted: 2023-11-30 | Resolved: 2024-07-13

## 2024-07-13 PROBLEM — Z3A.39 39 WEEKS GESTATION OF PREGNANCY (HHS-HCC): Status: RESOLVED | Noted: 2024-07-03 | Resolved: 2024-07-13

## 2024-07-14 NOTE — ASSESSMENT & PLAN NOTE
Patient is doing well postpartum. She was advised to slowly increase activity. Pelvic rest is recommended until 6 weeks postpartum. She was advised to continue prenatal vitamins and to assure adequate hydration.   Contraception was discussed. She has had bilateral salpingectomy.  She was advised to call the office with any concerning symptom, worsening of pain or bleeding, concern for postpartum depression or anxiety.   Follow up is planned in 4 weeks.

## 2024-07-14 NOTE — PROGRESS NOTES
Postpartum Progress Note    Assessment/Plan   Fatuma Dang is a 24 y.o.,  presents for postpartum visit. She delivered on 7/3/2024 via repeat CS with bilateral salpingectomy for contraception. Surgery and hospital course were uncomplicated.    She admits to feeling emotional with crying episodes. This is better than after last delivery, and she is unsure if this is depression. Family is supportive. We discussed options of medication and counseling and she declines this for now but will consider.    Problem List       No episode was linked to this visit.          Hospital course: no complications       Subjective         She reports no breast or nursing problems  She reports mild depression, reports no thoughts of harming self or baby(ies), and reports no suicidal/homicidal ideation today   Her plan for contraception is tubal ligation           Allergies:   Patient has no known allergies.         Physical Exam:  Physical Exam  Constitutional:       Appearance: Normal appearance.   HENT:      Head: Normocephalic and atraumatic.   Pulmonary:      Effort: Pulmonary effort is normal.   Abdominal:      Palpations: Abdomen is soft.      Comments: Incision is healing well.    Neurological:      General: No focal deficit present.      Mental Status: She is alert and oriented to person, place, and time.   Skin:     General: Skin is warm and dry.      Findings: No rash.   Psychiatric:         Mood and Affect: Mood normal.          Problem List Items Addressed This Visit       Routine postpartum follow-up ([facility]) - Primary    Overview     She delivered on 7/3/2024 via repeat CS with bilateral salpingectomy for contraception. Surgery and hospital course were uncomplicated.         Current Assessment & Plan     Patient is doing well postpartum. She was advised to slowly increase activity. Pelvic rest is recommended until 6 weeks postpartum. She was advised to continue prenatal vitamins and to assure adequate hydration.    Contraception was discussed. She has had bilateral salpingectomy.  She was advised to call the office with any concerning symptom, worsening of pain or bleeding, concern for postpartum depression or anxiety.   Follow up is planned in 4 weeks.

## 2024-07-17 ENCOUNTER — APPOINTMENT (OUTPATIENT)
Dept: OBSTETRICS AND GYNECOLOGY | Facility: CLINIC | Age: 24
End: 2024-07-17
Payer: COMMERCIAL

## 2024-07-17 VITALS — BODY MASS INDEX: 29.62 KG/M2 | DIASTOLIC BLOOD PRESSURE: 82 MMHG | WEIGHT: 178 LBS | SYSTOLIC BLOOD PRESSURE: 120 MMHG

## 2024-07-17 PROCEDURE — 0503F POSTPARTUM CARE VISIT: CPT | Performed by: OBSTETRICS & GYNECOLOGY

## 2024-07-17 ASSESSMENT — EDINBURGH POSTNATAL DEPRESSION SCALE (EPDS)
I HAVE BEEN ANXIOUS OR WORRIED FOR NO GOOD REASON: YES, SOMETIMES
I HAVE FELT SCARED OR PANICKY FOR NO GOOD REASON: YES, SOMETIMES
I HAVE BEEN SO UNHAPPY THAT I HAVE BEEN CRYING: YES, QUITE OFTEN
I HAVE BEEN ABLE TO LAUGH AND SEE THE FUNNY SIDE OF THINGS: NOT QUITE SO MUCH NOW
TOTAL SCORE: 14
I HAVE BLAMED MYSELF UNNECESSARILY WHEN THINGS WENT WRONG: YES, SOME OF THE TIME
I HAVE LOOKED FORWARD WITH ENJOYMENT TO THINGS: RATHER LESS THAN I USED TO
THE THOUGHT OF HARMING MYSELF HAS OCCURRED TO ME: NEVER
I HAVE BEEN SO UNHAPPY THAT I HAVE HAD DIFFICULTY SLEEPING: NOT VERY OFTEN
THINGS HAVE BEEN GETTING ON TOP OF ME: YES, SOMETIMES I HAVEN'T BEEN COPING AS WELL AS USUAL
I HAVE FELT SAD OR MISERABLE: NOT VERY OFTEN

## 2024-07-26 ENCOUNTER — TELEPHONE (OUTPATIENT)
Dept: OBSTETRICS AND GYNECOLOGY | Facility: CLINIC | Age: 24
End: 2024-07-26
Payer: COMMERCIAL

## 2024-07-26 NOTE — TELEPHONE ENCOUNTER
Recommendation discussed with patient, she is agreeable with plan and will call if worsening or unresolved symptoms for evaluation.

## 2024-07-26 NOTE — TELEPHONE ENCOUNTER
Please call the patient and let her know that she can  lotrimin cream over the counter and apply a small amount of this to both nipples after feeding. If she herself develops any worsening symptoms of thrush, I am happy to see her in the office or send in an Rx for her.   Thanks!

## 2024-07-26 NOTE — TELEPHONE ENCOUNTER
Patient states that her 3week old new born has thrush, and would like to know if a medication can be called in for herself (the mother) since she is breast feeding and does not want to get this also. Please advise.     Pharmacy- [facility] in [place]

## 2024-08-06 PROBLEM — O26.899 RH NEGATIVE STATE IN ANTEPARTUM PERIOD (HHS-HCC): Status: RESOLVED | Noted: 2023-11-30 | Resolved: 2024-07-03

## 2024-08-06 PROBLEM — O99.283 HYPOTHYROIDISM AFFECTING PREGNANCY IN THIRD TRIMESTER (HHS-HCC): Status: RESOLVED | Noted: 2023-11-30 | Resolved: 2024-07-03

## 2024-08-06 PROBLEM — O99.213 OBESITY AFFECTING PREGNANCY IN THIRD TRIMESTER (HHS-HCC): Status: RESOLVED | Noted: 2023-11-30 | Resolved: 2024-07-03

## 2024-08-06 PROBLEM — O36.5990 SGA (SMALL FOR GESTATIONAL AGE), FETAL, AFFECTING CARE OF MOTHER, ANTEPARTUM (HHS-HCC): Status: RESOLVED | Noted: 2024-05-30 | Resolved: 2024-07-03

## 2024-08-06 PROBLEM — Z67.91 RH NEGATIVE STATE IN ANTEPARTUM PERIOD (HHS-HCC): Status: RESOLVED | Noted: 2023-11-30 | Resolved: 2024-07-03

## 2024-08-06 PROBLEM — O34.219 UTERINE SCAR FROM PREVIOUS CESAREAN DELIVERY AFFECTING PREGNANCY (HHS-HCC): Status: RESOLVED | Noted: 2023-11-30 | Resolved: 2024-07-03

## 2024-08-06 PROBLEM — E03.9 HYPOTHYROIDISM AFFECTING PREGNANCY IN THIRD TRIMESTER (HHS-HCC): Status: RESOLVED | Noted: 2023-11-30 | Resolved: 2024-07-03

## 2024-08-12 NOTE — ASSESSMENT & PLAN NOTE
Patient is doing well postpartum. She may return to normal activity. She was advised to continue prenatal vitamins and to assure adequate hydration.   Contraception is tubal ligation.  She was advised to call the office with any concerning symptom, worsening of pain or bleeding, concern for postpartum depression or anxiety.   Follow up is planned in 1 year or as needed.

## 2024-08-12 NOTE — PROGRESS NOTES
Postpartum Progress Note    Assessment/Plan   Fatuma Dang is a 24 y.o.,  presents for postpartum visit. She delivered on 7/3/2024 via repeat CS with bilateral salpingectomy for contraception. Surgery and hospital course were uncomplicated.           Problem List       No episode was linked to this visit.          Hospital course: no complications       Subjective         She reports no breast or nursing problems  She denies emotional concerns today   Her plan for contraception is tubal ligation           Allergies:   Patient has no known allergies.         Physical Exam:  Physical Exam  Constitutional:       Appearance: Normal appearance.   Genitourinary:      Bladder, rectum and urethral meatus normal.      Right Labia: No rash or lesions.     Left Labia: No lesions or rash.     No vaginal discharge.      No vaginal prolapse present.     No vaginal atrophy present.       Right Adnexa: not tender and no mass present.     Left Adnexa: not tender and no mass present.     No cervical discharge or lesion.      Uterus is not enlarged or tender.      Pelvic exam was performed with patient in the lithotomy position.   HENT:      Head: Normocephalic and atraumatic.      Nose: Nose normal.   Cardiovascular:      Rate and Rhythm: Normal rate and regular rhythm.      Heart sounds: Normal heart sounds.   Pulmonary:      Effort: Pulmonary effort is normal.      Breath sounds: Normal breath sounds.   Abdominal:      Palpations: Abdomen is soft.   Musculoskeletal:      Cervical back: Neck supple.   Neurological:      General: No focal deficit present.      Mental Status: She is alert and oriented to person, place, and time.   Skin:     General: Skin is warm and dry.      Findings: No rash.   Psychiatric:         Mood and Affect: Mood normal.          Problem List Items Addressed This Visit       Routine postpartum follow-up ([facility]) - Primary    Overview     She delivered on 7/3/2024 via repeat CS with bilateral  salpingectomy for contraception. Surgery and hospital course were uncomplicated.         Current Assessment & Plan     Patient is doing well postpartum. She may return to normal activity. She was advised to continue prenatal vitamins and to assure adequate hydration.   Contraception is tubal ligation.  She was advised to call the office with any concerning symptom, worsening of pain or bleeding, concern for postpartum depression or anxiety.   Follow up is planned in 1 year or as needed.

## 2024-08-14 ENCOUNTER — APPOINTMENT (OUTPATIENT)
Dept: OBSTETRICS AND GYNECOLOGY | Facility: CLINIC | Age: 24
End: 2024-08-14
Payer: COMMERCIAL

## 2024-08-14 VITALS — SYSTOLIC BLOOD PRESSURE: 118 MMHG | DIASTOLIC BLOOD PRESSURE: 88 MMHG | WEIGHT: 177 LBS | BODY MASS INDEX: 29.45 KG/M2

## 2024-08-14 PROCEDURE — 0503F POSTPARTUM CARE VISIT: CPT | Performed by: OBSTETRICS & GYNECOLOGY

## 2024-08-14 ASSESSMENT — EDINBURGH POSTNATAL DEPRESSION SCALE (EPDS)
I HAVE FELT SCARED OR PANICKY FOR NO GOOD REASON: NO, NOT MUCH
I HAVE BLAMED MYSELF UNNECESSARILY WHEN THINGS WENT WRONG: YES, SOME OF THE TIME
I HAVE BEEN SO UNHAPPY THAT I HAVE HAD DIFFICULTY SLEEPING: NOT AT ALL
THINGS HAVE BEEN GETTING ON TOP OF ME: NO, MOST OF THE TIME I HAVE COPED QUITE WELL
I HAVE BEEN SO UNHAPPY THAT I HAVE BEEN CRYING: ONLY OCCASIONALLY
THE THOUGHT OF HARMING MYSELF HAS OCCURRED TO ME: NEVER
I HAVE LOOKED FORWARD WITH ENJOYMENT TO THINGS: AS MUCH AS I EVER DID
I HAVE BEEN ABLE TO LAUGH AND SEE THE FUNNY SIDE OF THINGS: AS MUCH AS I ALWAYS COULD
TOTAL SCORE: 7
I HAVE BEEN ANXIOUS OR WORRIED FOR NO GOOD REASON: HARDLY EVER
I HAVE FELT SAD OR MISERABLE: NOT VERY OFTEN

## 2024-08-16 ENCOUNTER — APPOINTMENT (OUTPATIENT)
Dept: ENDOCRINOLOGY | Facility: CLINIC | Age: 24
End: 2024-08-16
Payer: COMMERCIAL

## 2024-08-16 ENCOUNTER — LAB (OUTPATIENT)
Dept: LAB | Facility: LAB | Age: 24
End: 2024-08-16
Payer: COMMERCIAL

## 2024-08-16 VITALS
SYSTOLIC BLOOD PRESSURE: 102 MMHG | HEIGHT: 65 IN | HEART RATE: 71 BPM | WEIGHT: 176.8 LBS | BODY MASS INDEX: 29.46 KG/M2 | DIASTOLIC BLOOD PRESSURE: 60 MMHG

## 2024-08-16 DIAGNOSIS — E03.9 HYPOTHYROIDISM AFFECTING PREGNANCY IN FIRST TRIMESTER (HHS-HCC): ICD-10-CM

## 2024-08-16 DIAGNOSIS — E06.3 HASHIMOTO'S THYROIDITIS: Primary | ICD-10-CM

## 2024-08-16 DIAGNOSIS — O99.281 HYPOTHYROIDISM AFFECTING PREGNANCY IN FIRST TRIMESTER (HHS-HCC): ICD-10-CM

## 2024-08-16 DIAGNOSIS — E06.3 HASHIMOTO'S THYROIDITIS: ICD-10-CM

## 2024-08-16 LAB — TSH SERPL-ACNC: 1.13 MIU/L (ref 0.44–3.98)

## 2024-08-16 PROCEDURE — 84443 ASSAY THYROID STIM HORMONE: CPT

## 2024-08-16 PROCEDURE — 3008F BODY MASS INDEX DOCD: CPT | Performed by: INTERNAL MEDICINE

## 2024-08-16 PROCEDURE — 99213 OFFICE O/P EST LOW 20 MIN: CPT | Performed by: INTERNAL MEDICINE

## 2024-08-16 PROCEDURE — 36415 COLL VENOUS BLD VENIPUNCTURE: CPT

## 2024-08-16 NOTE — PATIENT INSTRUCTIONS
Thank you for choosing [facility]  for your health care needs.  If you have any questions, concerns or medical needs, please feel free to contact our office at [phone].    Please ensure you complete your blood work one week before the next scheduled appointment.    To obtain blood test today   To continue Levothyroxine 112mcg daily   Take levothyroxine on an empty stomach with water alone, 30-60 before eating or taking other medications, 4 hours before any calcium or iron supplement  For follow up in 6 months

## 2024-08-16 NOTE — PROGRESS NOTES
"Subjective   Fatuma Dang is a 24 y.o. female who presents for follow up for Hashimoto's thyroiditis.      She underwent a C section on 7/3.  Her prepregnancy weight was 185 lbs but she did lose down to 165 lbs initially.   Her maximum weight at the end of pregnancy was 195 lbs.  Her son is 2.5 years old    She goes back to work November.       Currently Regimen  Levothyroxine 112mcg po daily      Symptoms are as listed below:  Energy levels - good  Sleep - wakes up  once per night   Temperature intolerances -denies   Night sweats - admits   Bowel movements -once daily; regular  Hair changes -denies shedding   Tremors - denies   Palpitations - denies   Dysphagia - denies   Dyspnea upon lying supine -denies   Dysphonia -denies   Weight changes -lost       Review of Systems   Constitutional:  Unexpected weight change: Weight loss.   All other systems reviewed and are negative.      Objective   /60 (BP Location: Left arm, Patient Position: Sitting, BP Cuff Size: Adult)   Pulse 71   Ht 1.651 m (5' 5\")   Wt 80.2 kg (176 lb 12.8 oz)   BMI 29.42 kg/m²    Visit Vitals  /60 (BP Location: Left arm, Patient Position: Sitting, BP Cuff Size: Adult)   Pulse 71   Ht 1.651 m (5' 5\")   Wt 80.2 kg (176 lb 12.8 oz)   BMI 29.42 kg/m²   OB Status Recent pregnancy   Smoking Status Never   BSA 1.92 m²      Visit Vitals  /60 (BP Location: Left arm, Patient Position: Sitting, BP Cuff Size: Adult)   Pulse 71   Ht 1.651 m (5' 5\")   Wt 80.2 kg (176 lb 12.8 oz)   BMI 29.42 kg/m²   OB Status Recent pregnancy   Smoking Status Never   BSA 1.92 m²       Physical Exam  Vitals and nursing note reviewed.   Constitutional:       General: She is not in acute distress.     Appearance: Normal appearance. She is normal weight.   HENT:      Head: Normocephalic and atraumatic.      Nose: Nose normal.      Mouth/Throat:      Mouth: Mucous membranes are moist.   Eyes:      Extraocular Movements: Extraocular movements intact. "   Cardiovascular:      Rate and Rhythm: Normal rate and regular rhythm.   Pulmonary:      Effort: Pulmonary effort is normal.      Breath sounds: Normal breath sounds.   Abdominal:      Comments: Gravid    Musculoskeletal:         General: Normal range of motion.   Skin:     General: Skin is warm.   Neurological:      Mental Status: She is alert and oriented to person, place, and time.   Psychiatric:         Mood and Affect: Mood normal.         Lab Results   Component Value Date    TSH 1.13 08/16/2024    FREET4 0.66 08/16/2022       Assessment/Plan   24 year old female presents for follow up for Hashimoto's thyroiditis.  She delivered her baby on 7/3.    Hashimoto's thyroiditis  To obtain blood test today   To continue Levothyroxine 112mcg daily   Take levothyroxine on an empty stomach with water alone, 30-60 before eating or taking other medications, 4 hours before any calcium or iron supplement  Counseled that thyroid requirements will be decreased with weight loss   Counseled regarding the symptoms of thyrotoxicosis should further weight loss occur  For follow up in 6 months

## 2024-08-20 RX ORDER — LEVOTHYROXINE SODIUM 112 UG/1
112 TABLET ORAL
Qty: 30 TABLET | Refills: 5 | Status: SHIPPED | OUTPATIENT
Start: 2024-08-20 | End: 2025-02-16

## 2024-08-20 NOTE — ASSESSMENT & PLAN NOTE
To obtain blood test today   To continue Levothyroxine 112mcg daily   Take levothyroxine on an empty stomach with water alone, 30-60 before eating or taking other medications, 4 hours before any calcium or iron supplement  Counseled that thyroid requirements will be decreased with weight loss   Counseled regarding the symptoms of thyrotoxicosis should further weight loss occur  For follow up in 6 months

## 2025-02-19 NOTE — PROGRESS NOTES
"Subjective   Fatuma Dang is a 24 y.o. female who presents for follow up for Hashimoto's thyroiditis.      Her son is 3 years old.  She is exclusively pumping for her 7-month-old.    Currently Regimen  Levothyroxine 112mcg po daily      Symptoms are as listed below:  Energy levels - good; works from home   Sleep - wakes up  once per night   Temperature intolerances -denies; thermo set to 68 degrees   Bowel movements -once daily; regular  Hair changes -denies shedding   Skin changes- denies acne   Tremors - admits    Palpitations - denies; started in the last 1 month   Dysphagia - denies   Dyspnea upon lying supine -denies   Dysphonia -denies   Weight changes -denies        Spotting almost all the time; daily  on a weekly basis   Cycles were always five weeks apart     Review of Systems   Gastrointestinal:  Positive for nausea. Negative for vomiting.   Neurological:  Positive for dizziness and weakness. Negative for headaches.       Objective   /72   Pulse 90   Ht 1.651 m (5' 5\")   Wt 80.3 kg (177 lb)   BMI 29.45 kg/m²    Visit Vitals  /72   Pulse 90   Ht 1.651 m (5' 5\")   Wt 80.3 kg (177 lb)   BMI 29.45 kg/m²   OB Status Recent pregnancy   Smoking Status Never   BSA 1.92 m²          Physical Exam  Vitals and nursing note reviewed.   Constitutional:       General: She is not in acute distress.     Appearance: Normal appearance. She is normal weight.   HENT:      Head: Normocephalic and atraumatic.      Nose: Nose normal.      Mouth/Throat:      Mouth: Mucous membranes are moist.   Eyes:      Extraocular Movements: Extraocular movements intact.   Cardiovascular:      Rate and Rhythm: Normal rate and regular rhythm.   Pulmonary:      Effort: Pulmonary effort is normal.      Breath sounds: Normal breath sounds.   Musculoskeletal:         General: Normal range of motion.   Skin:     General: Skin is warm.   Neurological:      Mental Status: She is alert and oriented to person, place, and time. "   Psychiatric:         Mood and Affect: Mood normal.         Lab Results   Component Value Date    TSH 1.13 08/16/2024    FREET4 0.66 08/16/2022       Assessment/Plan   24 year old female presents for follow up for Hashimoto's thyroiditis.      Hashimoto's thyroiditis  To obtain blood test today   To continue Levothyroxine 112mcg daily   Take levothyroxine on an empty stomach with water alone, 30-60 before eating or taking other medications, 4 hours before any calcium or iron supplement  For follow up in 6 months

## 2025-02-19 NOTE — PATIENT INSTRUCTIONS
Thank you for choosing [facility]  for your health care needs.  If you have any questions, concerns or medical needs, please feel free to contact our office at [phone].    Please ensure you complete your blood work one week before the next scheduled appointment.    To obtain blood test today   To continue Levothyroxine 112mcg daily   Take levothyroxine on an empty stomach with water alone, 30-60 before eating or taking other medications, 4 hours before any calcium or iron supplement  For follow up in 6 months

## 2025-02-20 ENCOUNTER — APPOINTMENT (OUTPATIENT)
Dept: ENDOCRINOLOGY | Facility: CLINIC | Age: 25
End: 2025-02-20
Payer: COMMERCIAL

## 2025-02-20 VITALS
HEIGHT: 65 IN | DIASTOLIC BLOOD PRESSURE: 72 MMHG | WEIGHT: 177 LBS | BODY MASS INDEX: 29.49 KG/M2 | HEART RATE: 90 BPM | SYSTOLIC BLOOD PRESSURE: 118 MMHG

## 2025-02-20 DIAGNOSIS — E06.3 HASHIMOTO'S THYROIDITIS: ICD-10-CM

## 2025-02-20 DIAGNOSIS — O99.281 HYPOTHYROIDISM AFFECTING PREGNANCY IN FIRST TRIMESTER (HHS-HCC): ICD-10-CM

## 2025-02-20 DIAGNOSIS — E03.9 HYPOTHYROIDISM AFFECTING PREGNANCY IN FIRST TRIMESTER (HHS-HCC): ICD-10-CM

## 2025-02-20 PROCEDURE — 3008F BODY MASS INDEX DOCD: CPT | Performed by: INTERNAL MEDICINE

## 2025-02-20 PROCEDURE — 99213 OFFICE O/P EST LOW 20 MIN: CPT | Performed by: INTERNAL MEDICINE

## 2025-02-20 ASSESSMENT — ENCOUNTER SYMPTOMS
DIZZINESS: 1
HEADACHES: 0
VOMITING: 0
NAUSEA: 1
WEAKNESS: 1

## 2025-02-21 LAB
T4 FREE SERPL-MCNC: 1.1 NG/DL (ref 0.8–1.8)
TSH SERPL-ACNC: 4.95 MIU/L

## 2025-02-23 RX ORDER — LEVOTHYROXINE SODIUM 112 UG/1
TABLET ORAL
Qty: 32 TABLET | Refills: 5 | Status: SHIPPED | OUTPATIENT
Start: 2025-02-23

## 2025-02-23 NOTE — RESULT ENCOUNTER NOTE
Labs have been reviewed.  The TSH is elevated above goal.  Please take 1 tablet daily for 6 days per week and 2 tablets daily for 1 day per week.  Please repeat a level in six weeks.

## 2025-02-23 NOTE — ASSESSMENT & PLAN NOTE
To obtain blood test today   To continue Levothyroxine 112mcg daily   Take levothyroxine on an empty stomach with water alone, 30-60 before eating or taking other medications, 4 hours before any calcium or iron supplement  For follow up in 6 months

## 2025-04-06 DIAGNOSIS — E06.3 HASHIMOTO'S THYROIDITIS: ICD-10-CM

## 2025-05-02 ENCOUNTER — TELEPHONE (OUTPATIENT)
Dept: ENDOCRINOLOGY | Facility: CLINIC | Age: 25
End: 2025-05-02
Payer: COMMERCIAL

## 2025-08-20 ENCOUNTER — APPOINTMENT (OUTPATIENT)
Dept: ENDOCRINOLOGY | Facility: CLINIC | Age: 25
End: 2025-08-20
Payer: COMMERCIAL

## 2025-09-04 ENCOUNTER — APPOINTMENT (OUTPATIENT)
Dept: OBSTETRICS AND GYNECOLOGY | Facility: CLINIC | Age: 25
End: 2025-09-04
Payer: COMMERCIAL

## 2025-09-24 ENCOUNTER — APPOINTMENT (OUTPATIENT)
Dept: OBSTETRICS AND GYNECOLOGY | Facility: CLINIC | Age: 25
End: 2025-09-24
Payer: COMMERCIAL

## 2025-10-02 ENCOUNTER — APPOINTMENT (OUTPATIENT)
Dept: ENDOCRINOLOGY | Facility: CLINIC | Age: 25
End: 2025-10-02
Payer: COMMERCIAL

## (undated) DEVICE — SUTURE, VICRYL, 4-0, 27 IN, KS, UNDYED

## (undated) DEVICE — CATHETER TRAY, SURESTEP, 16FR, URINE METER W/STATLOCK

## (undated) DEVICE — GOWN, SURGICAL, SMARTGOWN, XLARGE, STERILE

## (undated) DEVICE — SOLUTION, IRRIGATION, SODIUM CHLORIDE 0.9%, 1000 ML, POUR BOTTLE

## (undated) DEVICE — SUTURE, PDS II, 0 36 IN, CT-1, VIOLET

## (undated) DEVICE — GLOVE, SURGICAL, PROTEXIS PI , 6.5, PF, LF

## (undated) DEVICE — LIGASURE, CURVED, SMALL JAW

## (undated) DEVICE — STRAP, VELCRO, BODY, 4 X 60IN, NS

## (undated) DEVICE — Device

## (undated) DEVICE — COVER HANDLE LIGHT, STERIS, BLUE, STERILE

## (undated) DEVICE — DRESSING, MEPILEX BORDER, POST-OP AG, 4 X 10 IN

## (undated) DEVICE — GLOVE, SURGICAL, PROTEXIS PI , 7.5, PF, LF

## (undated) DEVICE — DRAPE PACK, CESAREAN SECTION, CUSTOM, UHC

## (undated) DEVICE — SUTURE, VICRYL, 2-0, 27 IN, CT-1, VIOLET

## (undated) DEVICE — HEMOSTAT, ARISTA, ABSORBABLE, 3 GRAMS

## (undated) DEVICE — SUTURE, VICRYL, 3-0, 27 IN, CT-1, UNDYED

## (undated) DEVICE — SUTURE, VICRYL, 0, 36 IN, CT, UNDYED